# Patient Record
Sex: FEMALE | Race: WHITE | NOT HISPANIC OR LATINO | Employment: FULL TIME | URBAN - METROPOLITAN AREA
[De-identification: names, ages, dates, MRNs, and addresses within clinical notes are randomized per-mention and may not be internally consistent; named-entity substitution may affect disease eponyms.]

---

## 2018-02-08 ENCOUNTER — HOSPITAL ENCOUNTER (OUTPATIENT)
Dept: LAB | Facility: MEDICAL CENTER | Age: 33
End: 2018-02-08
Attending: NURSE PRACTITIONER
Payer: COMMERCIAL

## 2018-02-08 ENCOUNTER — OFFICE VISIT (OUTPATIENT)
Dept: MEDICAL GROUP | Facility: PHYSICIAN GROUP | Age: 33
End: 2018-02-08
Payer: COMMERCIAL

## 2018-02-08 VITALS
RESPIRATION RATE: 16 BRPM | TEMPERATURE: 99.9 F | SYSTOLIC BLOOD PRESSURE: 110 MMHG | OXYGEN SATURATION: 96 % | HEIGHT: 70 IN | DIASTOLIC BLOOD PRESSURE: 80 MMHG | HEART RATE: 72 BPM | WEIGHT: 182 LBS | BODY MASS INDEX: 26.05 KG/M2

## 2018-02-08 DIAGNOSIS — Z00.00 ENCOUNTER FOR WELLNESS EXAMINATION: ICD-10-CM

## 2018-02-08 DIAGNOSIS — Z00.00 WELLNESS EXAMINATION: ICD-10-CM

## 2018-02-08 DIAGNOSIS — E03.9 HYPOTHYROIDISM, UNSPECIFIED TYPE: ICD-10-CM

## 2018-02-08 DIAGNOSIS — I45.10 INCOMPLETE RBBB: ICD-10-CM

## 2018-02-08 DIAGNOSIS — Z76.89 ENCOUNTER TO ESTABLISH CARE WITH NEW DOCTOR: ICD-10-CM

## 2018-02-08 LAB
25(OH)D3 SERPL-MCNC: 11 NG/ML (ref 30–100)
ALBUMIN SERPL BCP-MCNC: 4.5 G/DL (ref 3.2–4.9)
ALBUMIN/GLOB SERPL: 1.6 G/DL
ALP SERPL-CCNC: 42 U/L (ref 30–99)
ALT SERPL-CCNC: 9 U/L (ref 2–50)
ANION GAP SERPL CALC-SCNC: 7 MMOL/L (ref 0–11.9)
AST SERPL-CCNC: 12 U/L (ref 12–45)
BASOPHILS # BLD AUTO: 0.8 % (ref 0–1.8)
BASOPHILS # BLD: 0.06 K/UL (ref 0–0.12)
BILIRUB SERPL-MCNC: 0.7 MG/DL (ref 0.1–1.5)
BUN SERPL-MCNC: 17 MG/DL (ref 8–22)
CALCIUM SERPL-MCNC: 9.8 MG/DL (ref 8.5–10.5)
CHLORIDE SERPL-SCNC: 107 MMOL/L (ref 96–112)
CHOLEST SERPL-MCNC: 141 MG/DL (ref 100–199)
CO2 SERPL-SCNC: 23 MMOL/L (ref 20–33)
CREAT SERPL-MCNC: 0.77 MG/DL (ref 0.5–1.4)
EOSINOPHIL # BLD AUTO: 0.21 K/UL (ref 0–0.51)
EOSINOPHIL NFR BLD: 2.9 % (ref 0–6.9)
ERYTHROCYTE [DISTWIDTH] IN BLOOD BY AUTOMATED COUNT: 38.3 FL (ref 35.9–50)
GLOBULIN SER CALC-MCNC: 2.8 G/DL (ref 1.9–3.5)
GLUCOSE SERPL-MCNC: 80 MG/DL (ref 65–99)
HCT VFR BLD AUTO: 47.1 % (ref 37–47)
HDLC SERPL-MCNC: 64 MG/DL
HGB BLD-MCNC: 15.1 G/DL (ref 12–16)
IMM GRANULOCYTES # BLD AUTO: 0.02 K/UL (ref 0–0.11)
IMM GRANULOCYTES NFR BLD AUTO: 0.3 % (ref 0–0.9)
LDLC SERPL CALC-MCNC: 66 MG/DL
LYMPHOCYTES # BLD AUTO: 1.75 K/UL (ref 1–4.8)
LYMPHOCYTES NFR BLD: 24.5 % (ref 22–41)
MCH RBC QN AUTO: 27.8 PG (ref 27–33)
MCHC RBC AUTO-ENTMCNC: 32.1 G/DL (ref 33.6–35)
MCV RBC AUTO: 86.7 FL (ref 81.4–97.8)
MONOCYTES # BLD AUTO: 0.59 K/UL (ref 0–0.85)
MONOCYTES NFR BLD AUTO: 8.3 % (ref 0–13.4)
NEUTROPHILS # BLD AUTO: 4.5 K/UL (ref 2–7.15)
NEUTROPHILS NFR BLD: 63.2 % (ref 44–72)
NRBC # BLD AUTO: 0 K/UL
NRBC BLD-RTO: 0 /100 WBC
PLATELET # BLD AUTO: 314 K/UL (ref 164–446)
PMV BLD AUTO: 10.1 FL (ref 9–12.9)
POTASSIUM SERPL-SCNC: 4.3 MMOL/L (ref 3.6–5.5)
PROT SERPL-MCNC: 7.3 G/DL (ref 6–8.2)
RBC # BLD AUTO: 5.43 M/UL (ref 4.2–5.4)
SODIUM SERPL-SCNC: 137 MMOL/L (ref 135–145)
T3 SERPL-MCNC: 75.5 NG/DL (ref 60–181)
T4 FREE SERPL-MCNC: 1.1 NG/DL (ref 0.53–1.43)
TRIGL SERPL-MCNC: 57 MG/DL (ref 0–149)
TSH SERPL DL<=0.005 MIU/L-ACNC: 1.68 UIU/ML (ref 0.38–5.33)
WBC # BLD AUTO: 7.1 K/UL (ref 4.8–10.8)

## 2018-02-08 PROCEDURE — 99385 PREV VISIT NEW AGE 18-39: CPT | Performed by: NURSE PRACTITIONER

## 2018-02-08 PROCEDURE — 82306 VITAMIN D 25 HYDROXY: CPT

## 2018-02-08 PROCEDURE — 36415 COLL VENOUS BLD VENIPUNCTURE: CPT

## 2018-02-08 PROCEDURE — 85025 COMPLETE CBC W/AUTO DIFF WBC: CPT

## 2018-02-08 PROCEDURE — 84443 ASSAY THYROID STIM HORMONE: CPT

## 2018-02-08 PROCEDURE — 80053 COMPREHEN METABOLIC PANEL: CPT

## 2018-02-08 PROCEDURE — 84480 ASSAY TRIIODOTHYRONINE (T3): CPT

## 2018-02-08 PROCEDURE — 84439 ASSAY OF FREE THYROXINE: CPT

## 2018-02-08 PROCEDURE — 80061 LIPID PANEL: CPT

## 2018-02-08 ASSESSMENT — PATIENT HEALTH QUESTIONNAIRE - PHQ9: CLINICAL INTERPRETATION OF PHQ2 SCORE: 0

## 2018-02-08 NOTE — ASSESSMENT & PLAN NOTE
She is here for her annual wellness visit.  She has no current issues or complaints, and she is due for wellness labs.

## 2018-02-08 NOTE — PROGRESS NOTES
Chief Complaint   Patient presents with   • Hypothyroidism       HISTORY OF PRESENT ILLNESS: Patient is a 32 y.o. female new patient who presents today to discuss the following issues:    Encounter to establish care with new doctor  Is here to establish with a new primary care provider.  Was previously seen by Yasmin Murray.      Incomplete RBBB  Diagnosed in 2012, worked up by cardiology.  No need for further follow-up.    Hypothyroid  Chronic in nature, stable on meds.  Followed by Dr. Steve.    Encounter for wellness examination  She is here for her annual wellness visit.  She has no current issues or complaints, and she is due for wellness labs.      Patient Active Problem List    Diagnosis Date Noted   • Encounter to establish care with new doctor 02/08/2018   • Encounter for wellness examination 02/08/2018   • Anxiety 04/24/2014   • Incomplete RBBB 12/12/2012   • Hypothyroid 12/12/2012       Allergies:Patient has no known allergies.    Current Outpatient Prescriptions   Medication Sig Dispense Refill   • Levothyroxine Sodium (TIROSINT) 25 MCG CAPS Take 50 mcg by mouth every day.       No current facility-administered medications for this visit.        Social History   Substance Use Topics   • Smoking status: Former Smoker     Quit date: 12/12/2010   • Smokeless tobacco: Never Used   • Alcohol use No       Family Status   Relation Status   • Mother Alive   • Father Alive   • Sister Alive   • Brother Alive   • Brother Alive     Family History   Problem Relation Age of Onset   • Hypertension Father        Review of Systems:   Constitutional: Negative for fever, chills, weight loss and malaise/fatigue.   HENT: Negative for ear pain, nosebleeds, congestion, sore throat and neck pain.    Eyes: Negative for blurred vision.   Respiratory: Negative for cough, sputum production, shortness of breath and wheezing.    Cardiovascular: Negative for chest pain, palpitations, orthopnea and leg swelling.  "  Gastrointestinal: Negative for heartburn, nausea, vomiting and abdominal pain.   Genitourinary: Negative for dysuria, urgency and frequency.   Musculoskeletal: Negative for myalgias, joint pain, and back pain.  Skin: Negative for rash and itching.   Neurological: Negative for dizziness, tingling, tremors, sensory change, focal weakness and headaches.   Endo/Heme/Allergies: Does not bruise/bleed easily.   Psychiatric/Behavioral: Negative for depression, suicidal ideas and memory loss.  The patient is not nervous/anxious and does not have insomnia.    All other systems reviewed and are negative except as in HPI.    Exam:  Blood pressure 110/80, pulse 72, temperature 37.7 °C (99.9 °F), resp. rate 16, height 1.778 m (5' 10\"), weight 82.6 kg (182 lb), last menstrual period 01/18/2018, SpO2 96 %, not currently breastfeeding.  General:  Well nourished, well developed female in NAD  Head: Grossly normal.  Neck: Supple without JVD or bruit. Thyroid is not enlarged.  Pulmonary: Clear to ausculation. Normal effort. No rales, ronchi, or wheezing.  Cardiovascular: Regular rate and rhythm without murmur.   Abdomen:  Bowel sounds + x 4. Soft, non-tender, nondistended.  Extremities: No clubbing, cyanosis, or edema.  Skin: Intact with no obvious rashes or lesions.  Neuro: Grossly intact.  Psych: Alert and oriented x 3.  Mood and affect appropriate.    Medical decision-making and discussion: Ceci is here to establish with a new primary care provider.  We reviewed her past medical history and discussed her current medications.  Lab work was ordered. She will sign a records release for her previous provider, [facility], and she will plan to follow-up here as needed.         Assessment/Plan:  1. Encounter to establish care with new doctor     2. Incomplete RBBB     3. Wellness examination  CBC WITH DIFFERENTIAL    COMP METABOLIC PANEL    LIPID PROFILE    TSH    VITAMIN D,25 HYDROXY    TRIIDOTHYRONINE    FREE " THYROXINE   4. Hypothyroidism, unspecified type     5. Encounter for wellness examination         Return if symptoms worsen or fail to improve.    Please note that this dictation was created using voice recognition software. I have made every reasonable attempt to correct obvious errors, but I expect that there are errors of grammar and possibly content that I did not discover before finalizing the note.

## 2018-02-13 RX ORDER — ERGOCALCIFEROL 1.25 MG/1
CAPSULE ORAL
Qty: 16 CAP | Refills: 0 | Status: SHIPPED | OUTPATIENT
Start: 2018-02-13 | End: 2018-03-12 | Stop reason: SDUPTHER

## 2018-03-12 RX ORDER — ERGOCALCIFEROL 1.25 MG/1
50000 CAPSULE ORAL
Qty: 12 CAP | Refills: 3 | Status: SHIPPED | OUTPATIENT
Start: 2018-03-12 | End: 2019-12-11

## 2018-03-12 NOTE — TELEPHONE ENCOUNTER
Irineo- You initiated pt on vit 2/18, not sure if you would like pt to continue this dose or adjust. Please advise.

## 2019-03-12 ENCOUNTER — HOSPITAL ENCOUNTER (OUTPATIENT)
Dept: LAB | Facility: MEDICAL CENTER | Age: 34
End: 2019-03-12
Attending: PHYSICIAN ASSISTANT
Payer: COMMERCIAL

## 2019-03-12 LAB
T4 FREE SERPL-MCNC: 1.09 NG/DL (ref 0.53–1.43)
TSH SERPL DL<=0.005 MIU/L-ACNC: 1.25 UIU/ML (ref 0.38–5.33)

## 2019-03-12 PROCEDURE — 84443 ASSAY THYROID STIM HORMONE: CPT

## 2019-03-12 PROCEDURE — 84439 ASSAY OF FREE THYROXINE: CPT

## 2019-03-12 PROCEDURE — 36415 COLL VENOUS BLD VENIPUNCTURE: CPT

## 2019-03-21 NOTE — TELEPHONE ENCOUNTER
Was the patient seen in the last year in this department? No     Does patient have an active prescription for medications requested? No     Received Request Via: Pharmacy      Pt met protocol?: No   Pt last ov 2/18 has an upcoming appt 4/2/19   25-Hydroxy   Vitamin D 25   Date Value Ref Range Status   02/08/2018 11 (L) 30 - 100 ng/mL Final     Comment:     Adult Ranges:   <20 ng/mL - Deficiency  20-29 ng/mL - Insufficiency   ng/mL - Sufficiency  The [employer]ia Centaur Vitamin D Assay is standardized to the  [facility] reference measurement procedures, a  reference method for the Vitamin D Standardization Program  (VDSP).  The VDSP aligns patient results among 25 (OH)  Vitamin D methods.

## 2019-03-22 RX ORDER — ERGOCALCIFEROL 1.25 MG/1
CAPSULE ORAL
Qty: 12 CAP | Refills: 0 | Status: SHIPPED | OUTPATIENT
Start: 2019-03-22 | End: 2019-04-02

## 2019-04-02 ENCOUNTER — OFFICE VISIT (OUTPATIENT)
Dept: MEDICAL GROUP | Facility: PHYSICIAN GROUP | Age: 34
End: 2019-04-02
Payer: COMMERCIAL

## 2019-04-02 VITALS
HEIGHT: 70 IN | BODY MASS INDEX: 25.05 KG/M2 | TEMPERATURE: 98.6 F | WEIGHT: 175 LBS | RESPIRATION RATE: 17 BRPM | DIASTOLIC BLOOD PRESSURE: 64 MMHG | HEART RATE: 75 BPM | OXYGEN SATURATION: 99 % | SYSTOLIC BLOOD PRESSURE: 118 MMHG

## 2019-04-02 DIAGNOSIS — R00.2 PALPITATIONS: ICD-10-CM

## 2019-04-02 DIAGNOSIS — Z00.00 WELLNESS EXAMINATION: ICD-10-CM

## 2019-04-02 DIAGNOSIS — Z00.00 ENCOUNTER FOR WELLNESS EXAMINATION: ICD-10-CM

## 2019-04-02 DIAGNOSIS — E03.9 HYPOTHYROIDISM, UNSPECIFIED TYPE: ICD-10-CM

## 2019-04-02 DIAGNOSIS — R07.9 CHEST PAIN, UNSPECIFIED TYPE: ICD-10-CM

## 2019-04-02 PROBLEM — Z76.89 ENCOUNTER TO ESTABLISH CARE WITH NEW DOCTOR: Status: RESOLVED | Noted: 2018-02-08 | Resolved: 2019-04-02

## 2019-04-02 PROCEDURE — 99395 PREV VISIT EST AGE 18-39: CPT | Performed by: NURSE PRACTITIONER

## 2019-04-02 RX ORDER — LEVOTHYROXINE SODIUM 0.05 MG/1
50 TABLET ORAL
COMMUNITY
End: 2019-12-11

## 2019-04-02 ASSESSMENT — PATIENT HEALTH QUESTIONNAIRE - PHQ9: CLINICAL INTERPRETATION OF PHQ2 SCORE: 0

## 2019-04-03 NOTE — PROGRESS NOTES
Chief Complaint   Patient presents with   • Annual Exam   • Orders Needed     Requesting Labs        HISTORY OF PRESENT ILLNESS: Patient is a 33 y.o. female established patient who presents today to discuss the following issues:    Chest pain  Reports that for several years she has had issues with intermittent chest pain.  She has had ekgs, a stress test, a stress echo, and a holter monitor, all of which were normal.  She reports that she feels like her heart pounds at times, and she feels like her blood pressure is very sensitive to changes.  None of her symptoms occurred during any of her previous testing.  Discussed options, and she would like to proceed with a referral to cardiology.  She has no chest pain or symptoms at this time.    Encounter for wellness examination  She is here for her annual wellness visit.  She is due for labs.    Hypothyroid  Followed by endocrinology.  Recent labs were normal.      Patient Active Problem List    Diagnosis Date Noted   • Palpitations 04/02/2019   • Encounter for wellness examination 02/08/2018   • Anxiety 04/24/2014   • Chest pain 12/12/2012   • Incomplete RBBB 12/12/2012   • Hypothyroid 12/12/2012       Allergies:Patient has no known allergies.    Current Outpatient Prescriptions   Medication Sig Dispense Refill   • levothyroxine (SYNTHROID) 50 MCG Tab Take 50 mcg by mouth Every morning on an empty stomach.     • vitamin D, Ergocalciferol, (DRISDOL) 66757 units Cap capsule Take 1 Cap by mouth every 7 days. 12 Cap 3     No current facility-administered medications for this visit.        Social History   Substance Use Topics   • Smoking status: Former Smoker     Quit date: 12/12/2010   • Smokeless tobacco: Never Used   • Alcohol use No       Family Status   Relation Status   • Mo Alive   • Fa Alive   • Sis Alive   • Bro Alive   • Bro Alive     Family History   Problem Relation Age of Onset   • Hypertension Father        Review of Systems:   Constitutional: Negative for  "fever, chills, weight loss and malaise/fatigue.   HENT: Negative for ear pain, nosebleeds, congestion, sore throat and neck pain.    Eyes: Negative for blurred vision.   Respiratory: Negative for cough, sputum production, shortness of breath and wheezing.    Cardiovascular: Negative for orthopnea and leg swelling. Positive for intermittent chest pain and palpitations.  Gastrointestinal: Negative for heartburn, nausea, vomiting and abdominal pain.   Genitourinary: Negative for dysuria, urgency and frequency.   Musculoskeletal: Negative for myalgias, joint pain, and back pain.  Skin: Negative for rash and itching.   Neurological: Negative for dizziness, tingling, tremors, sensory change, focal weakness and headaches.   Endo/Heme/Allergies: Does not bruise/bleed easily.   Psychiatric/Behavioral: Negative for depression, suicidal ideas and memory loss.  The patient is not nervous/anxious and does not have insomnia.    All other systems reviewed and are negative except as in HPI.    Exam:  Blood pressure 118/64, pulse 75, temperature 37 °C (98.6 °F), resp. rate 17, height 1.778 m (5' 10\"), weight 79.4 kg (175 lb), last menstrual period 03/26/2019, SpO2 99 %, not currently breastfeeding.  General:  Well nourished, well developed female in NAD  Head: Grossly normal.  Neck: Supple without JVD or bruit. Thyroid is not enlarged.  Pulmonary: Clear to ausculation. Normal effort. No rales, ronchi, or wheezing.  Cardiovascular: Regular rate and rhythm without murmur. No chest wall tenderness.  Abdomen:  Soft, nontender, nondistended.  Extremities: No clubbing, cyanosis, or edema.  Skin: Intact with no obvious rashes or lesions.  Neuro: Grossly intact.  Psych: Alert and oriented x 3.  Mood and affect appropriate.    Medical decision-making and discussion: Ceci is here today for her annual wellness visit.  Lab work was ordered and a referral ws sent to cardiology.  She will follow-up here as needed.          Assessment/Plan:  1. " Wellness examination  CBC WITH DIFFERENTIAL    Comp Metabolic Panel    Lipid Profile    VITAMIN D,25 HYDROXY   2. Palpitations  REFERRAL TO CARDIOLOGY   3. Chest pain, unspecified type  REFERRAL TO CARDIOLOGY   4. Encounter for wellness examination     5. Hypothyroidism, unspecified type         Return if symptoms worsen or fail to improve.    Please note that this dictation was created using voice recognition software. I have made every reasonable attempt to correct obvious errors, but I expect that there are errors of grammar and possibly content that I did not discover before finalizing the note.

## 2019-04-03 NOTE — ASSESSMENT & PLAN NOTE
Reports that for several years she has had issues with intermittent chest pain.  She has had ekgs, a stress test, a stress echo, and a holter monitor, all of which were normal.  She reports that she feels like her heart pounds at times, and she feels like her blood pressure is very sensitive to changes.  None of her symptoms occurred during any of her previous testing.  Discussed options, and she would like to proceed with a referral to cardiology.  She has no chest pain or symptoms at this time.

## 2019-04-27 ENCOUNTER — HOSPITAL ENCOUNTER (OUTPATIENT)
Dept: LAB | Facility: MEDICAL CENTER | Age: 34
End: 2019-04-27
Attending: NURSE PRACTITIONER
Payer: COMMERCIAL

## 2019-04-27 DIAGNOSIS — Z00.00 WELLNESS EXAMINATION: ICD-10-CM

## 2019-04-27 LAB
25(OH)D3 SERPL-MCNC: 38 NG/ML (ref 30–100)
ALBUMIN SERPL BCP-MCNC: 4.3 G/DL (ref 3.2–4.9)
ALBUMIN/GLOB SERPL: 1.7 G/DL
ALP SERPL-CCNC: 50 U/L (ref 30–99)
ALT SERPL-CCNC: 8 U/L (ref 2–50)
ANION GAP SERPL CALC-SCNC: 6 MMOL/L (ref 0–11.9)
AST SERPL-CCNC: 10 U/L (ref 12–45)
BASOPHILS # BLD AUTO: 1.3 % (ref 0–1.8)
BASOPHILS # BLD: 0.06 K/UL (ref 0–0.12)
BILIRUB SERPL-MCNC: 0.7 MG/DL (ref 0.1–1.5)
BUN SERPL-MCNC: 16 MG/DL (ref 8–22)
CALCIUM SERPL-MCNC: 9.4 MG/DL (ref 8.5–10.5)
CHLORIDE SERPL-SCNC: 108 MMOL/L (ref 96–112)
CHOLEST SERPL-MCNC: 159 MG/DL (ref 100–199)
CO2 SERPL-SCNC: 26 MMOL/L (ref 20–33)
CREAT SERPL-MCNC: 0.78 MG/DL (ref 0.5–1.4)
EOSINOPHIL # BLD AUTO: 0.24 K/UL (ref 0–0.51)
EOSINOPHIL NFR BLD: 5.3 % (ref 0–6.9)
ERYTHROCYTE [DISTWIDTH] IN BLOOD BY AUTOMATED COUNT: 38.5 FL (ref 35.9–50)
FASTING STATUS PATIENT QL REPORTED: NORMAL
GLOBULIN SER CALC-MCNC: 2.5 G/DL (ref 1.9–3.5)
GLUCOSE SERPL-MCNC: 96 MG/DL (ref 65–99)
HCT VFR BLD AUTO: 46.6 % (ref 37–47)
HDLC SERPL-MCNC: 65 MG/DL
HGB BLD-MCNC: 15.1 G/DL (ref 12–16)
IMM GRANULOCYTES # BLD AUTO: 0 K/UL (ref 0–0.11)
IMM GRANULOCYTES NFR BLD AUTO: 0 % (ref 0–0.9)
LDLC SERPL CALC-MCNC: 85 MG/DL
LYMPHOCYTES # BLD AUTO: 1.53 K/UL (ref 1–4.8)
LYMPHOCYTES NFR BLD: 34 % (ref 22–41)
MCH RBC QN AUTO: 28.8 PG (ref 27–33)
MCHC RBC AUTO-ENTMCNC: 32.4 G/DL (ref 33.6–35)
MCV RBC AUTO: 88.8 FL (ref 81.4–97.8)
MONOCYTES # BLD AUTO: 0.39 K/UL (ref 0–0.85)
MONOCYTES NFR BLD AUTO: 8.7 % (ref 0–13.4)
NEUTROPHILS # BLD AUTO: 2.28 K/UL (ref 2–7.15)
NEUTROPHILS NFR BLD: 50.7 % (ref 44–72)
NRBC # BLD AUTO: 0 K/UL
NRBC BLD-RTO: 0 /100 WBC
PLATELET # BLD AUTO: 309 K/UL (ref 164–446)
PMV BLD AUTO: 10 FL (ref 9–12.9)
POTASSIUM SERPL-SCNC: 4.4 MMOL/L (ref 3.6–5.5)
PROT SERPL-MCNC: 6.8 G/DL (ref 6–8.2)
RBC # BLD AUTO: 5.25 M/UL (ref 4.2–5.4)
SODIUM SERPL-SCNC: 140 MMOL/L (ref 135–145)
TRIGL SERPL-MCNC: 46 MG/DL (ref 0–149)
WBC # BLD AUTO: 4.5 K/UL (ref 4.8–10.8)

## 2019-04-27 PROCEDURE — 80061 LIPID PANEL: CPT

## 2019-04-27 PROCEDURE — 80053 COMPREHEN METABOLIC PANEL: CPT

## 2019-04-27 PROCEDURE — 85025 COMPLETE CBC W/AUTO DIFF WBC: CPT

## 2019-04-27 PROCEDURE — 82306 VITAMIN D 25 HYDROXY: CPT

## 2019-04-27 PROCEDURE — 36415 COLL VENOUS BLD VENIPUNCTURE: CPT

## 2019-06-06 ENCOUNTER — HOSPITAL ENCOUNTER (OUTPATIENT)
Dept: LAB | Facility: MEDICAL CENTER | Age: 34
End: 2019-06-06
Attending: FAMILY MEDICINE
Payer: COMMERCIAL

## 2019-06-06 LAB
ERYTHROCYTE [SEDIMENTATION RATE] IN BLOOD BY WESTERGREN METHOD: 4 MM/HOUR (ref 0–20)
EST. AVERAGE GLUCOSE BLD GHB EST-MCNC: 105 MG/DL
FERRITIN SERPL-MCNC: 25.4 NG/ML (ref 10–291)
HBA1C MFR BLD: 5.3 % (ref 0–5.6)
IRON SATN MFR SERPL: 32 % (ref 15–55)
IRON SERPL-MCNC: 94 UG/DL (ref 40–170)
TIBC SERPL-MCNC: 294 UG/DL (ref 250–450)
VIT B12 SERPL-MCNC: 317 PG/ML (ref 211–911)

## 2019-06-06 PROCEDURE — 83540 ASSAY OF IRON: CPT

## 2019-06-06 PROCEDURE — 82728 ASSAY OF FERRITIN: CPT

## 2019-06-06 PROCEDURE — 86003 ALLG SPEC IGE CRUDE XTRC EA: CPT | Mod: 91

## 2019-06-06 PROCEDURE — 82607 VITAMIN B-12: CPT

## 2019-06-06 PROCEDURE — 82785 ASSAY OF IGE: CPT

## 2019-06-06 PROCEDURE — 83036 HEMOGLOBIN GLYCOSYLATED A1C: CPT

## 2019-06-06 PROCEDURE — 86038 ANTINUCLEAR ANTIBODIES: CPT

## 2019-06-06 PROCEDURE — 85652 RBC SED RATE AUTOMATED: CPT

## 2019-06-06 PROCEDURE — 36415 COLL VENOUS BLD VENIPUNCTURE: CPT

## 2019-06-06 PROCEDURE — 83550 IRON BINDING TEST: CPT

## 2019-06-09 LAB — NUCLEAR IGG SER QL IA: NORMAL

## 2019-06-11 LAB
ALMOND IGE QN: <0.1 KU/L
AVOCADO IGE QN: 0.16 KU/L
BANANA IGE QN: 0.2 KU/L
CELERY IGE QN: 0.48 KU/L
CHESTNUT IGE QN: 0.13 KU/L
COCONUT IGE QN: 0.14 KU/L
COW MILK IGE QN: <0.1 KU/L
DEPRECATED MISC ALLERGEN IGE RAST QL: NORMAL
EGG WHITE IGE QN: <0.1 KU/L
GRAPE IGE QN: <0.1 KU/L
IGE SERPL-ACNC: 30 KU/L
KIWIFRUIT IGE QN: 0.1 KU/L
OAT IGE QN: <0.1 KU/L
PAPAYA IGE QN: 0.16 KU/L
PEANUT IGE QN: 0.15 KU/L
PECAN/HICK NUT IGE QN: <0.1 KU/L
POTATO IGE QN: <0.1 KU/L
SESAME SEED IGE QN: 0.25 KU/L
SOYBEAN IGE QN: <0.1 KU/L
TOMATO IGE QN: 0.25 KU/L
WATERMELON IGE QN: 0.11 KU/L
WHEAT IGE QN: 0.15 KU/L

## 2019-06-24 NOTE — TELEPHONE ENCOUNTER
Was the patient seen in the last year in this department? Yes    Does patient have an active prescription for medications requested? No     Received Request Via: Pharmacy      Pt met protocol?: Yes    LAST OV 04/02/2019      Lab Results  Component Value Date/Time   25HYDROXY 38 04/27/2019 0726

## 2019-06-25 RX ORDER — ERGOCALCIFEROL 1.25 MG/1
CAPSULE ORAL
Refills: 0 | OUTPATIENT
Start: 2019-06-25

## 2019-07-17 RX ORDER — ERGOCALCIFEROL 1.25 MG/1
CAPSULE ORAL
Refills: 0 | OUTPATIENT
Start: 2019-07-17

## 2019-08-27 ENCOUNTER — OFFICE VISIT (OUTPATIENT)
Dept: MEDICAL GROUP | Facility: PHYSICIAN GROUP | Age: 34
End: 2019-08-27
Payer: COMMERCIAL

## 2019-08-27 ENCOUNTER — HOSPITAL ENCOUNTER (OUTPATIENT)
Dept: RADIOLOGY | Facility: MEDICAL CENTER | Age: 34
End: 2019-08-27
Attending: NURSE PRACTITIONER
Payer: COMMERCIAL

## 2019-08-27 VITALS
TEMPERATURE: 98.3 F | HEART RATE: 79 BPM | OXYGEN SATURATION: 94 % | SYSTOLIC BLOOD PRESSURE: 110 MMHG | WEIGHT: 168 LBS | RESPIRATION RATE: 16 BRPM | BODY MASS INDEX: 24.05 KG/M2 | HEIGHT: 70 IN | DIASTOLIC BLOOD PRESSURE: 60 MMHG

## 2019-08-27 DIAGNOSIS — M54.5 LOW BACK PAIN, UNSPECIFIED BACK PAIN LATERALITY, UNSPECIFIED CHRONICITY, WITH SCIATICA PRESENCE UNSPECIFIED: ICD-10-CM

## 2019-08-27 PROBLEM — M54.50 LOW BACK PAIN: Status: ACTIVE | Noted: 2019-08-27

## 2019-08-27 PROBLEM — Z00.00 ENCOUNTER FOR WELLNESS EXAMINATION: Status: RESOLVED | Noted: 2018-02-08 | Resolved: 2019-08-27

## 2019-08-27 PROCEDURE — 99214 OFFICE O/P EST MOD 30 MIN: CPT | Performed by: NURSE PRACTITIONER

## 2019-08-27 PROCEDURE — 72100 X-RAY EXAM L-S SPINE 2/3 VWS: CPT

## 2019-08-27 RX ORDER — METHYLPREDNISOLONE 4 MG/1
TABLET ORAL
Qty: 21 TAB | Refills: 0 | Status: SHIPPED | OUTPATIENT
Start: 2019-08-27 | End: 2019-12-11

## 2019-08-27 RX ORDER — LEVOTHYROXINE SODIUM 50 UG/1
CAPSULE ORAL
Refills: 1 | COMMUNITY
Start: 2019-07-23 | End: 2019-12-11

## 2019-08-27 NOTE — PROGRESS NOTES
Chief Complaint   Patient presents with   • Back Pain     x 4 months        HISTORY OF PRESENT ILLNESS: Patient is a 33 y.o. female established patient who presents today to discuss the following issues:    Low back pain  Low back pain started about 4 months ago when she started running for exercise.  She saw a chiropractor and it calmed down, but she continues to have intermittent pain.  The pain is mid low back, sharp, and radiates into her left SI joint, and down her left leg.  Discussed options.  She would like to proceed with an xray, a medrol dose pack, and a trial of physical therapy.  She will follow-up if her symptoms do not improve.      Patient Active Problem List    Diagnosis Date Noted   • Low back pain 2019   • Palpitations 2019   • Anxiety 2014   • Incomplete RBBB 2012   • Hypothyroid 2012       Allergies:Patient has no known allergies.    Current Outpatient Medications   Medication Sig Dispense Refill   • TIROSINT 50 MCG Cap TAKE ONE CAPSULE BY MOUTH IN THE MORNING SIX DAYS PER WEEK AND ON THE 7th DAY TAKE TWO CAPSULES AS DIRECTED  1   • methylPREDNISolone (MEDROL DOSEPAK) 4 MG Tablet Therapy Pack As directed on the packaging label. 21 Tab 0   • levothyroxine (SYNTHROID) 50 MCG Tab Take 50 mcg by mouth Every morning on an empty stomach.     • vitamin D, Ergocalciferol, (DRISDOL) 81208 units Cap capsule Take 1 Cap by mouth every 7 days. (Patient not taking: Reported on 2019) 12 Cap 3     No current facility-administered medications for this visit.        Social History     Tobacco Use   • Smoking status: Former Smoker     Last attempt to quit: 2010     Years since quittin.7   • Smokeless tobacco: Never Used   Substance Use Topics   • Alcohol use: No   • Drug use: No       Family Status   Relation Name Status   • Mo  Alive   • Fa  Alive   • Sis  Alive   • Bro  Alive   • Bro  Alive     Family History   Problem Relation Age of Onset   • Hypertension Father   "      Review of Systems:   Constitutional: Negative for fever, chills, weight loss and malaise/fatigue.   HENT: Negative for ear pain, nosebleeds, congestion, sore throat and neck pain.    Eyes: Negative for blurred vision.   Respiratory: Negative for cough, sputum production, shortness of breath and wheezing.    Cardiovascular: Negative for chest pain, palpitations, orthopnea and leg swelling.   Gastrointestinal: Negative for heartburn, nausea, vomiting and abdominal pain.   Genitourinary: Negative for dysuria, urgency and frequency.   Musculoskeletal: Negative for myalgias, joint pain.  Positive for low back pain with radiculopathy.  Skin: Negative for rash and itching.   Neurological: Negative for dizziness, tingling, tremors, sensory change, focal weakness and headaches.   Endo/Heme/Allergies: Does not bruise/bleed easily.   Psychiatric/Behavioral: Negative for depression, suicidal ideas and memory loss.  The patient is not nervous/anxious and does not have insomnia.    All other systems reviewed and are negative except as in HPI.    Exam:  /60   Pulse 79   Temp 36.8 °C (98.3 °F)   Resp 16   Ht 1.778 m (5' 10\")   Wt 76.2 kg (168 lb)   SpO2 94%   General:  Well nourished, well developed female in NAD  Head: Grossly normal.  Neck: Supple without JVD or bruit. Thyroid is not enlarged.  Pulmonary: Clear to ausculation. Normal effort. No rales, ronchi, or wheezing.  Cardiovascular: Regular rate and rhythm without murmur.   Abdomen:  Soft, nontender, nondistended.  Extremities: No clubbing, cyanosis, or edema.  Skin: Intact with no obvious rashes or lesions.  Neuro: Grossly intact.  Psych: Alert and oriented x 3.  Mood and affect appropriate.    Medical decision-making and discussion: Ceci is here today to discuss back pain.  An xray was ordered, a medrol dose pack was sent to her pharmacy, and a referral was sent to PT.  She will follow-up here as needed.          Assessment/Plan:  1. Low back pain, " unspecified back pain laterality, unspecified chronicity, with sciatica presence unspecified  DX-LUMBAR SPINE-2 OR 3 VIEWS    methylPREDNISolone (MEDROL DOSEPAK) 4 MG Tablet Therapy Pack    REFERRAL TO PHYSICAL THERAPY Reason for Therapy: Eval/Treat/Report       Return if symptoms worsen or fail to improve.    Please note that this dictation was created using voice recognition software. I have made every reasonable attempt to correct obvious errors, but I expect that there are errors of grammar and possibly content that I did not discover before finalizing the note.

## 2019-08-27 NOTE — ASSESSMENT & PLAN NOTE
Low back pain started about 4 months ago when she started running for exercise.  She saw a chiropractor and it calmed down, but she continues to have intermittent pain.  The pain is mid low back, sharp, and radiates into her left SI joint, and down her left leg.  Discussed options.  She would like to proceed with an xray, a medrol dose pack, and a trial of physical therapy.  She will follow-up if her symptoms do not improve.

## 2019-09-30 ENCOUNTER — HOSPITAL ENCOUNTER (OUTPATIENT)
Dept: LAB | Facility: MEDICAL CENTER | Age: 34
End: 2019-09-30
Attending: INTERNAL MEDICINE
Payer: COMMERCIAL

## 2019-09-30 LAB
T4 FREE SERPL-MCNC: 1.09 NG/DL (ref 0.53–1.43)
TSH SERPL DL<=0.005 MIU/L-ACNC: 1.79 UIU/ML (ref 0.38–5.33)

## 2019-09-30 PROCEDURE — 84443 ASSAY THYROID STIM HORMONE: CPT

## 2019-09-30 PROCEDURE — 36415 COLL VENOUS BLD VENIPUNCTURE: CPT

## 2019-09-30 PROCEDURE — 84439 ASSAY OF FREE THYROXINE: CPT

## 2019-10-02 ENCOUNTER — PHYSICAL THERAPY (OUTPATIENT)
Dept: PHYSICAL THERAPY | Facility: REHABILITATION | Age: 34
End: 2019-10-02
Attending: NURSE PRACTITIONER
Payer: COMMERCIAL

## 2019-10-02 DIAGNOSIS — M54.50 LOW BACK PAIN, UNSPECIFIED BACK PAIN LATERALITY, UNSPECIFIED CHRONICITY, UNSPECIFIED WHETHER SCIATICA PRESENT: ICD-10-CM

## 2019-10-02 PROCEDURE — 97161 PT EVAL LOW COMPLEX 20 MIN: CPT

## 2019-10-02 PROCEDURE — 97110 THERAPEUTIC EXERCISES: CPT

## 2019-10-02 ASSESSMENT — ENCOUNTER SYMPTOMS
PAIN TIMING: INTERMITTENT
PAIN SCALE AT HIGHEST: 5
PAIN SCALE AT LOWEST: 0
QUALITY: SHARP
PAIN SCALE: 0

## 2019-10-02 NOTE — OP THERAPY EVALUATION
Outpatient Physical Therapy  INITIAL EVALUATION    [facility] [place]  [address], [address]  [place] [address]  Phone:  [phone]  Fax:  [phone]    Date of Evaluation: 10/02/2019    Patient: Ceci Nuñez  YOB: 1985  MRN: [id number]     Referring Provider: DONAL Wilcox  [address]  [place] [address]   Referring Diagnosis Low back pain, unspecified back pain laterality, unspecified chronicity, with sciatica presence unspecified [M54.5]     Time Calculation  Start time: 1630  Stop time: 1625 Time Calculation (min): 1435 minutes       Physical Therapy Occurrence Codes    Date physical therapy care plan established or reviewed:  10/2/19   Date physical therapy treatment started:  10/2/19          Chief Complaint: Back problem    Visit Diagnoses     ICD-10-CM   1. Low back pain, unspecified back pain laterality, unspecified chronicity, unspecified whether sciatica present M54.5       Subjective:   History of Present Illness:     Mechanism of injury:  Ceci Nuñez is a 33 y.o. female that presents to therapy with low back pain that radiates to the left leg no extending past her knee.  She states that symptoms came on with insidious onset. Patient denies fevers/chills, numbness/weakness of lower extremities, bowel/bladder incontinence, saddle anesthesia.     Aggravating factors: standing after sitting for a while. Standing for a long period of time. 30 minutes.     Relieving factors: sitting down, ice, NSAIDs.     ADL limitations: unable to return to recreational activities.      Pain:     Current pain ratin    At best pain ratin    At worst pain ratin    Quality:  Sharp    Pain timing:  Intermittent  Diagnostic Tests:     Diagnostic Tests Comments:  No acute fracture is detected.  Preserved lumbar lordosis. No lumbar scoliosis.  No significant lumbar spondylosis. Disc spaces are preserved. No lumbar facet hypertrophy. No  listhesis.      Past Medical History:   Diagnosis Date   • Hypothyroid 2012     History reviewed. No pertinent surgical history.  Social History     Tobacco Use   • Smoking status: Former Smoker     Last attempt to quit: 2010     Years since quittin.8   • Smokeless tobacco: Never Used   Substance Use Topics   • Alcohol use: No     Family and Occupational History     Socioeconomic History   • Marital status: Single     Spouse name: Not on file   • Number of children: Not on file   • Years of education: Not on file   • Highest education level: Not on file   Occupational History   • Not on file       Objective     Hip Screen   Hip range of motion within functional limits.  Hip strength within functional limits  Hip joint mobility within functional limits    Neurological Testing     Reflexes   Left   Ankle clonus reflex: negative    Right   Ankle clonus reflex: negative    Myotome testing   Lumbar (left)   All left lumbar myotomes within normal limits    Lumbar (right)   All right lumbar myotomes within normal limits    Dermatome testing   Lumbar (left)   All left lumbar dermatomes intact    Lumbar (right)   All right lumbar dermatomes intact    Active Range of Motion     Lumbar   Flexion: within functional limits  Extension: within functional limits (pain reported into calf)  Left lateral flexion: within functional limits  Right lateral flexion: within functional limits  Left rotation: within functional limits  Right rotation: within functional limits    Joint Play   Spine     Central PA [place]        L1: WFL       L2: WFL       L3: WFL       L4: WFL       L5: WFL       S1: WFL    Unilateral PA Glide (left)        L1: WFL       L2: WFL       L3: WFL       L4: WFL       L5: WFL       S1: WFL    Unilateral PA Glide (right)        L1: WFL       L2: WFL       L3: WFL       L4: WFL       L5: WFL       S1: WFL        Strength:      Abdominals   Lower abdominals: Able to maintain neutral statically (MMT  3+/5)    Tests       Lumbar spine (left)      Positive slump.     Left Pelvic Girdle/Sacrum   Negative: sacral rotation, sacral thrust and thigh thrust.     Left Hip   Negative Gaenslen's, SI compression and SI distraction.   SLR: Positive.     General Comments     Spine Comments   Prone extension reproduce back of thigh pain within one rep. Repeated motions not trialled based on pain that was not there prior to initiating prone extension.        Therapeutic Exercises (CPT 90060):       Therapeutic Exercise Summary: HEP instruction/performance and development. Handout provided and exercises located below:  Access Code: [email or web address]   URL: [email or web address]   Date: 10/02/2019   Prepared by: Jevon Finney      Program Notes   if symptoms increase please do not hesitate to call the office. [phone]. no pain to be reproduced with exercises.     Exercises  · Supine Sciatic Nerve Glide - 10 reps - 2x daily  · Supine Pelvic Tilt - 30 reps - 2x daily  · Supine Transversus Abdominis Bracing - Hands on Stomach - 2 reps - 30 hold - 2x daily          Time-based treatments/modalities:  Therapeutic exercise minutes (CPT 86302): 15 minutes       Assessment, Response and Plan:   Assessment details:  Ceci Nuñez is a 33 y.o. female with signs and symptoms consistent with lumbar radiculopathy without strength or sensation deficits. She requires skilled physical therapy intervention to decrease pain, increase functional mobility, improve ADL completion and establish a home exercise program.  Goals:   Short Term Goals:   1. Patient will be Independent with prescribed Home Exercise Program (HEP) and will be able to demonstrate exercises without cues for improved overall symptoms/activity tolerance.   2. Pt will improve ability to stand and walk for greater than 30 minutes without increased pain greater than 2/10.     Short term goal time span:  2-4 weeks      Long Term Goals:    3. Pt will return to recreational fitness  without exacerbation of pain greater than 1/10.   4. Pt will improve LBDI score to less than 5% indicative of improved function and reduced perceived disability.    Long term goal time span:  4-6 weeks    Plan:   Planned therapy interventions:  Therapeutic Exercise (CPT 89526), Therapeutic Activities (CPT 22254), Manual Therapy (CPT 31581), Neuromuscular Re-education (CPT 97833), E Stim Unattended (CPT 64192) and Gait Training (CPT 90021)  Frequency: 1-2x/week.  Duration in weeks:  6  Discussed with:  Patient    Functional Assessment Used  PT Functional Assessment Tool Used: Low Back Disability Questionnaire (revised oswestry)  PT Functional Assessment Score: 10%     Referring provider co-signature:  I have reviewed this plan of care and my co-signature certifies the need for services.  Certification Dates:   From 10/02/19   To 11/13/19    Physician Signature: ________________________________ Date: ______________

## 2019-10-04 ENCOUNTER — APPOINTMENT (OUTPATIENT)
Dept: PHYSICAL THERAPY | Facility: REHABILITATION | Age: 34
End: 2019-10-04
Attending: NURSE PRACTITIONER
Payer: COMMERCIAL

## 2019-10-09 ENCOUNTER — PHYSICAL THERAPY (OUTPATIENT)
Dept: PHYSICAL THERAPY | Facility: REHABILITATION | Age: 34
End: 2019-10-09
Attending: NURSE PRACTITIONER
Payer: COMMERCIAL

## 2019-10-09 DIAGNOSIS — M54.50 LOW BACK PAIN, UNSPECIFIED BACK PAIN LATERALITY, UNSPECIFIED CHRONICITY, UNSPECIFIED WHETHER SCIATICA PRESENT: ICD-10-CM

## 2019-10-09 PROCEDURE — 97110 THERAPEUTIC EXERCISES: CPT

## 2019-10-09 NOTE — OP THERAPY DAILY TREATMENT
Outpatient Physical Therapy  DAILY TREATMENT     [facility] [place]  [address] [place] [address], [address]  [facility] [address]  Phone:  [phone]  Fax:  [phone]    Date: 10/09/2019    Patient: Lauren Nuñez  YOB: 1985  MRN: [id number]     Time Calculation  Start time: 1630  Stop time: 1700 Time Calculation (min): 30 minutes       Chief Complaint: left leg/ back problem      Visit #: 2    SUBJECTIVE:  Pt reports pain about 5/10 at max since last visit. Went on 4 mile hike on Sunday. Pain with standing still o    OBJECTIVE:  Current objective measures: MMT 3+/5 lower abdominal. Pain into extension in supine.            Therapeutic Exercises (CPT 70170):     1. Supine marching, lvl 1 x 2 with cuff for proprioception of movement    2. Basic TrA with LE lift, x25    3. Ball bridges, x20    4. Basic TrA with ball roll, x25    5. Cat camel, x20    6. Prayer stretch, 30 sec x 3      Therapeutic Exercise Summary: HEP instruction/performance and development. Handout provided and routed for attachment to electronic chart.   Access Code: [email or web address]   URL: [email or web address]   Date: 10/09/2019   Prepared by: Jevon Finney      Program Notes   if symptoms increase please do not hesitate to call the office. [phone]. no pain to be reproduced with exercises.     Exercises  · Supine Sciatic Nerve Glide - 10 reps - 2x daily  · Supine Pelvic Tilt - 30 reps - 2x daily  · Child's Pose Stretch - 2 reps - 30 hold - 2x daily  · Supine March - 10 reps - 3 sets - 1x daily        Time-based treatments/modalities:  Therapeutic exercise minutes (CPT 43144): 30 minutes       Pain rating before treatment: 0  Pain rating after treatment: 0    ASSESSMENT:   Response to treatment: Pt reports no increased pain during exercises. Flexion based movement does not increase pain. Pt's hep advanced and is compliant with POC.     PLAN/RECOMMENDATIONS:   Plan for treatment: therapy treatment to continue next  visit.  Planned interventions for next visit: continue with current treatment.

## 2019-10-11 ENCOUNTER — APPOINTMENT (OUTPATIENT)
Dept: PHYSICAL THERAPY | Facility: REHABILITATION | Age: 34
End: 2019-10-11
Attending: NURSE PRACTITIONER
Payer: COMMERCIAL

## 2019-10-16 ENCOUNTER — PHYSICAL THERAPY (OUTPATIENT)
Dept: PHYSICAL THERAPY | Facility: REHABILITATION | Age: 34
End: 2019-10-16
Attending: NURSE PRACTITIONER
Payer: COMMERCIAL

## 2019-10-16 DIAGNOSIS — M54.50 LOW BACK PAIN, UNSPECIFIED BACK PAIN LATERALITY, UNSPECIFIED CHRONICITY, UNSPECIFIED WHETHER SCIATICA PRESENT: ICD-10-CM

## 2019-10-16 PROCEDURE — 97110 THERAPEUTIC EXERCISES: CPT

## 2019-10-16 NOTE — OP THERAPY DAILY TREATMENT
Outpatient Physical Therapy  DAILY TREATMENT     [facility] [place]  [address] [place] [address], [address]  [facility] [address]  Phone:  [phone]  Fax:  [phone]    Date: 10/16/2019    Patient: Lauren Nuñez  YOB: 1985  MRN: [id number]     Time Calculation  Start time: 0500  Stop time: 0530 Time Calculation (min): 30 minutes       Chief Complaint: left leg/ back problem      Visit #: 3    SUBJECTIVE:  Pt reports pain about 5/10 at max since last visit. Yesterday had pain that may have gone to calf with standing at work. Still feels that it may come on within 30 minutes of standing, other than yesterday cannot recall when it was bad within the last week.     OBJECTIVE:  Current objective measures: MMT 4/5 lower abdominal. Pain into extension in supine/standing. - slump, - SLR            Therapeutic Exercises (CPT 74480):     1. Supine marching, lvl 2 without cuff, lvl 3 x 25    2. Basic TrA with LE lift, x25    3. Ball bridges, x20 with posterior tilt with differernt ball heights    4. Basic TrA with ball roll, x25    5. Cat camel, x20    6. Prayer stretch, 30 sec x 3      Therapeutic Exercise Summary: Access Code: [email or web address]   URL: [email or web address]   Date: 10/16/2019   Prepared by: Jevon Finney      Program Notes   if symptoms increase please do not hesitate to call the office. [phone]. no pain to be reproduced with exercises.     Exercises  · Supine Sciatic Nerve Glide - 10 reps - 2x daily  · Supine Pelvic Tilt - 30 reps - 2x daily  · Child's Pose Stretch - 2 reps - 30 hold - 2x daily  · Supine Transversus Abdominis Bracing with Leg Extension - 10 reps - 3 sets - 1x daily  · Supine Bridge with Pelvic Floor Contraction on Swiss Ball - 10 reps - 2 sets - 3x weekly        Time-based treatments/modalities:  Therapeutic exercise minutes (CPT 75309): 30 minutes       Pain rating before treatment: 0  Pain rating after treatment: 0    ASSESSMENT:   Response to  treatment: Pt reports no increased pain during exercises. Flexion based movement does not increase pain. Pt's hep advanced and is compliant with POC.     PLAN/RECOMMENDATIONS:   Plan for treatment: therapy treatment to continue next visit.  Planned interventions for next visit: continue with current treatment.

## 2019-10-18 ENCOUNTER — APPOINTMENT (OUTPATIENT)
Dept: PHYSICAL THERAPY | Facility: REHABILITATION | Age: 34
End: 2019-10-18
Attending: NURSE PRACTITIONER
Payer: COMMERCIAL

## 2019-10-23 ENCOUNTER — APPOINTMENT (OUTPATIENT)
Dept: PHYSICAL THERAPY | Facility: REHABILITATION | Age: 34
End: 2019-10-23
Attending: NURSE PRACTITIONER
Payer: COMMERCIAL

## 2019-10-25 ENCOUNTER — APPOINTMENT (OUTPATIENT)
Dept: PHYSICAL THERAPY | Facility: REHABILITATION | Age: 34
End: 2019-10-25
Attending: NURSE PRACTITIONER
Payer: COMMERCIAL

## 2019-10-30 ENCOUNTER — PHYSICAL THERAPY (OUTPATIENT)
Dept: PHYSICAL THERAPY | Facility: REHABILITATION | Age: 34
End: 2019-10-30
Attending: NURSE PRACTITIONER
Payer: COMMERCIAL

## 2019-10-30 DIAGNOSIS — M54.50 LOW BACK PAIN, UNSPECIFIED BACK PAIN LATERALITY, UNSPECIFIED CHRONICITY, UNSPECIFIED WHETHER SCIATICA PRESENT: ICD-10-CM

## 2019-10-30 PROCEDURE — 97110 THERAPEUTIC EXERCISES: CPT

## 2019-10-30 PROCEDURE — 97014 ELECTRIC STIMULATION THERAPY: CPT

## 2019-10-31 NOTE — OP THERAPY DAILY TREATMENT
Outpatient Physical Therapy  DAILY TREATMENT     [facility] [place]  [address] [place] [address], [address]  [facility] [address]  Phone:  [phone]  Fax:  [phone]    Date: 10/30/2019    Patient: Lauren Nuñez  YOB: 1985  MRN: [id number]     Time Calculation  Start time: 0500  Stop time: 0545 Time Calculation (min): 45 minutes       Chief Complaint: left leg/ back problem      Visit #: 4    SUBJECTIVE:  Pt reports increased pain for 3 days. Pain going down the R leg just like previously on the left. Pt reports no change in activities or exercises. Pain is still worse with standing. Pt reports pain about 6/10 at max since last visit. Reports that taking IBU to be completely relieving.      OBJECTIVE:  Current objective measures: MMT 4/5 lower abdominal. Pain into extension in supine/standing. - slump, - SLR            Therapeutic Exercises (CPT 55862):     1. Supine marching, lvl 2 without cuff, lvl 3 x 25    2. Basic TrA with LE lift, x25    3. Ball bridges, x20 with posterior tilt with differernt ball heights, no change in pain    4. Basic TrA with ball roll, x25    5. Cat camel, x20    6. Prayer stretch, 30 sec x 3    Therapeutic Treatments and Modalities:     1. E Stim Unattended (CPT 25562), IFC and CP in supine x 15min       Time-based treatments/modalities:  Therapeutic exercise minutes (CPT 29894): 25 minutes       Pain rating before treatment: 0  Pain rating after treatment: 0    ASSESSMENT:   Response to treatment: pain overall increased and non responsive to movement today. Pt to continue HEP as long as non irritating and initiate prone press ups in one week if not improved or to contact her PCP if worsened.  Pt reports no increased pain during exercises.     PLAN/RECOMMENDATIONS:   Plan for treatment: therapy treatment to continue next visit.  Planned interventions for next visit: continue with current treatment.

## 2019-11-01 ENCOUNTER — APPOINTMENT (OUTPATIENT)
Dept: PHYSICAL THERAPY | Facility: REHABILITATION | Age: 34
End: 2019-11-01
Attending: NURSE PRACTITIONER
Payer: COMMERCIAL

## 2019-11-14 ENCOUNTER — PHYSICAL THERAPY (OUTPATIENT)
Dept: PHYSICAL THERAPY | Facility: REHABILITATION | Age: 34
End: 2019-11-14
Attending: NURSE PRACTITIONER
Payer: COMMERCIAL

## 2019-11-14 DIAGNOSIS — M54.50 LOW BACK PAIN, UNSPECIFIED BACK PAIN LATERALITY, UNSPECIFIED CHRONICITY, UNSPECIFIED WHETHER SCIATICA PRESENT: ICD-10-CM

## 2019-11-14 PROCEDURE — 97110 THERAPEUTIC EXERCISES: CPT

## 2019-11-14 NOTE — OP THERAPY DAILY TREATMENT
Outpatient Physical Therapy  DAILY TREATMENT     [facility] [place]  [address] [place] [address], [address]  [facility] [address]  Phone:  [phone]  Fax:  [phone]    Date: 11/14/2019    Patient: Lauren Nuñez  YOB: 1985  MRN: [id number]     Time Calculation  Start time: 0300  Stop time: 0323 Time Calculation (min): 23 minutes       Chief Complaint: left leg/ back problem      Visit #: 5    SUBJECTIVE:  No pain lately. Had relief within 2 days of last visit. Had no pain with recent hike over the weekend. Pain is reduced with standing or standing after sitting for a while.        OBJECTIVE:  Current objective measures: MMT 4/5 lower abdominal. - slump, - SLR            Therapeutic Exercises (CPT 86181):       Therapeutic Exercise Summary: HEP instruction/performance and development. Handout provided and exercises located below:  Access Code: [email or web address]   URL: [email or web address]   Date: 11/14/2019   Prepared by: Jevon Finney      Program Notes   if symptoms increase please do not hesitate to call the office. [phone]. no pain to be reproduced with exercises.     Exercises  · Supine Sciatic Nerve Glide - 10 reps - 2x daily  · Supine Pelvic Tilt - 30 reps - 2x daily  · Child's Pose Stretch - 2 reps - 30 hold - 2x daily  · Supine Transversus Abdominis Bracing with Leg Extension - 10 reps - 3 sets - 1x daily  · Supine March with Alternating Leg Lifts - 10 reps - 3 sets - 1x daily  · Neutral Curl Up with Straight Leg - 10 reps - 3 sets - 1x daily  · Supine Dead Bug with Leg Extension - 10 reps - 3 sets - 1x daily  · Supine Bridge with Pelvic Floor Contraction on Swiss Ball - 10 reps - 2 sets - 3x weekly        Time-based treatments/modalities:  Therapeutic exercise minutes (CPT 27552): 23 minutes       Pain rating before treatment: 0  Pain rating after treatment: 0    ASSESSMENT:   Response to treatment: GOALS met. Pt to trial independence with HEP for 30 days. F/u within  that time frame.     PLAN/RECOMMENDATIONS:   Plan for treatment: therapy treatment to continue next visit.  Planned interventions for next visit: continue with current treatment.

## 2019-11-30 ENCOUNTER — HOSPITAL ENCOUNTER (OUTPATIENT)
Dept: LAB | Facility: MEDICAL CENTER | Age: 34
End: 2019-11-30
Attending: PHYSICIAN ASSISTANT
Payer: COMMERCIAL

## 2019-11-30 LAB
T4 FREE SERPL-MCNC: 0.95 NG/DL (ref 0.53–1.43)
TSH SERPL DL<=0.005 MIU/L-ACNC: 2.35 UIU/ML (ref 0.38–5.33)

## 2019-11-30 PROCEDURE — 84443 ASSAY THYROID STIM HORMONE: CPT

## 2019-11-30 PROCEDURE — 84439 ASSAY OF FREE THYROXINE: CPT

## 2019-11-30 PROCEDURE — 36415 COLL VENOUS BLD VENIPUNCTURE: CPT

## 2019-12-11 ENCOUNTER — OFFICE VISIT (OUTPATIENT)
Dept: MEDICAL GROUP | Facility: PHYSICIAN GROUP | Age: 34
End: 2019-12-11
Payer: COMMERCIAL

## 2019-12-11 VITALS
RESPIRATION RATE: 16 BRPM | SYSTOLIC BLOOD PRESSURE: 122 MMHG | HEART RATE: 67 BPM | DIASTOLIC BLOOD PRESSURE: 72 MMHG | BODY MASS INDEX: 25.48 KG/M2 | WEIGHT: 178 LBS | OXYGEN SATURATION: 95 % | TEMPERATURE: 99.9 F | HEIGHT: 70 IN

## 2019-12-11 DIAGNOSIS — R10.9 FLANK PAIN: ICD-10-CM

## 2019-12-11 DIAGNOSIS — M25.50 ARTHRALGIA, UNSPECIFIED JOINT: ICD-10-CM

## 2019-12-11 LAB
APPEARANCE UR: CLEAR
BILIRUB UR STRIP-MCNC: NORMAL MG/DL
COLOR UR AUTO: YELLOW
GLUCOSE UR STRIP.AUTO-MCNC: NORMAL MG/DL
KETONES UR STRIP.AUTO-MCNC: 80 MG/DL
LEUKOCYTE ESTERASE UR QL STRIP.AUTO: NORMAL
NITRITE UR QL STRIP.AUTO: NORMAL
PH UR STRIP.AUTO: 5 [PH] (ref 5–8)
PROT UR QL STRIP: NORMAL MG/DL
RBC UR QL AUTO: NORMAL
SP GR UR STRIP.AUTO: 1.03
UROBILINOGEN UR STRIP-MCNC: 0.2 MG/DL

## 2019-12-11 PROCEDURE — 81002 URINALYSIS NONAUTO W/O SCOPE: CPT | Performed by: NURSE PRACTITIONER

## 2019-12-11 PROCEDURE — 99214 OFFICE O/P EST MOD 30 MIN: CPT | Performed by: NURSE PRACTITIONER

## 2019-12-11 RX ORDER — METHYLPREDNISOLONE 4 MG/1
TABLET ORAL
Qty: 21 TAB | Refills: 0 | Status: SHIPPED | OUTPATIENT
Start: 2019-12-11

## 2019-12-11 RX ORDER — LEVOTHYROXINE SODIUM 75 UG/1
1 CAPSULE ORAL
Refills: 0 | COMMUNITY
Start: 2019-10-09

## 2019-12-11 NOTE — PROGRESS NOTES
Chief Complaint   Patient presents with   • Low Back Pain       HISTORY OF PRESENT ILLNESS: Patient is a 34 y.o. female established patient who presents today to discuss the following issues:    Flank pain  Reports intermittent flank pain.  She was concerned that she had a kidney infection.  POCT UA normal.  She believes that it could be musculoskeletal in nature.      Arthralgia  Has been having lots of issues with joint pain.  Discussed plan to proceed with labs, followed by a medrol dose henrietta.        Patient Active Problem List    Diagnosis Date Noted   • Flank pain 2019   • Arthralgia 2019   • Low back pain 2019   • Palpitations 2019   • Anxiety 2014   • Incomplete RBBB 2012   • Hypothyroid 2012       Allergies:Patient has no known allergies.    Current Outpatient Medications   Medication Sig Dispense Refill   • TIROSINT 75 MCG Cap Take 1 Cap by mouth every day.  0   • methylPREDNISolone (MEDROL DOSEPAK) 4 MG Tablet Therapy Pack As directed on the packaging label. 21 Tab 0     No current facility-administered medications for this visit.        Social History     Tobacco Use   • Smoking status: Former Smoker     Packs/day: 0.00     Last attempt to quit: 2010     Years since quittin.0   • Smokeless tobacco: Never Used   Substance Use Topics   • Alcohol use: No   • Drug use: No       Family Status   Relation Name Status   • Mo  Alive   • Fa  Alive   • Sis  Alive   • Bro  Alive   • Bro  Alive     Family History   Problem Relation Age of Onset   • Hypertension Father        Review of Systems:   Constitutional: Negative for fever, chills, weight loss and malaise/fatigue.   HENT: Negative for ear pain, nosebleeds, congestion, sore throat and neck pain.    Eyes: Negative for blurred vision.   Respiratory: Negative for cough, sputum production, shortness of breath and wheezing.    Cardiovascular: Negative for chest pain, palpitations, orthopnea and leg swelling.  "  Gastrointestinal: Negative for heartburn, nausea, vomiting and abdominal pain.   Genitourinary: Negative for dysuria, urgency and frequency.   Musculoskeletal: Positive for back pain and joint pain.  Skin: Negative for rash and itching.   Neurological: Negative for dizziness, tingling, tremors, sensory change, focal weakness and headaches.   Endo/Heme/Allergies: Does not bruise/bleed easily.   Psychiatric/Behavioral: Negative for depression, suicidal ideas and memory loss.  The patient is not nervous/anxious and does not have insomnia.    All other systems reviewed and are negative except as in HPI.    Exam:  Blood Pressure 122/72   Pulse 67   Temperature 37.7 °C (99.9 °F)   Respiration 16   Height 1.778 m (5' 10\")   Weight 80.7 kg (178 lb)   Oxygen Saturation 95%   General:  Well nourished, well developed female in NAD  Head: Grossly normal.  Neck: Supple without JVD or bruit. Thyroid is not enlarged.  Pulmonary: Clear to ausculation. Normal effort. No rales, ronchi, or wheezing.  Cardiovascular: Regular rate and rhythm without murmur.   Abdomen:  Soft, nontender, nondistended.  Extremities: No clubbing, cyanosis, or edema.  Skin: Intact with no obvious rashes or lesions.  Neuro: Grossly intact.  Psych: Alert and oriented x 3.  Mood and affect appropriate.    Medical decision-making and discussion: Ceci is here today to discuss pain.  Lab work was ordered and medrol was sent to her pharmacy.  She will follow-up here as needed.          Assessment/Plan:  1. Flank pain  POCT Urinalysis   2. Arthralgia, unspecified joint  WESTERGREN SED RATE    Comp Metabolic Panel    VITAMIN D,25 HYDROXY    RHEUMATOID ARTHRITIS FACTOR    CCP ANTIBODY    methylPREDNISolone (MEDROL DOSEPAK) 4 MG Tablet Therapy Pack       Return if symptoms worsen or fail to improve.    Please note that this dictation was created using voice recognition software. I have made every reasonable attempt to correct obvious errors, but I expect that " there are errors of grammar and possibly content that I did not discover before finalizing the note.

## 2019-12-17 ENCOUNTER — TELEPHONE (OUTPATIENT)
Dept: PHYSICAL THERAPY | Facility: REHABILITATION | Age: 34
End: 2019-12-17

## 2019-12-17 NOTE — OP THERAPY DISCHARGE SUMMARY
Outpatient Physical Therapy  DISCHARGE SUMMARY NOTE      [facility] [place]  [address], [address]  [facility] [address]  Phone:  [phone]  Fax:  [phone]    Date of Visit: 12/17/2019    Patient: Lauren Nuñez  YOB: 1985  MRN: [id number]     Referring Provider: DONAL Wilcox   Referring Diagnosis Low back pain, unspecified back pain laterality, unspecified chronicity, with sciatica presence unspecified [M54.5]     Physical Therapy Occurrence Codes    Date physical therapy care plan established or reviewed:  10/2/19   Date physical therapy treatment started:  10/2/19          Functional Assessment Used    LBDI 0% at last visit    Your patient is being discharged from Physical Therapy with the following comments:   · Goals met    Comments:  Ceci Nuñez has completed 5 physical therapy sessions on her current prescription. She has improved function, decreased pain consistent ROM, and she continues to progress with her home exercise program. She was given 30 days to trial independence with HEP and instructed to return to the clinic if symptoms return or worsen. She did not return to the clinic. Recommend to discharge patient to full independent home exercise program at this time. Thank you for the opportunity to assist you and your patient.         Limitations Remaining:  none    Recommendations:  Continue HEP    Jevon Finney, PT, DPT    Date: 12/17/2019

## 2019-12-21 ENCOUNTER — HOSPITAL ENCOUNTER (OUTPATIENT)
Dept: LAB | Facility: MEDICAL CENTER | Age: 34
End: 2019-12-21
Attending: NURSE PRACTITIONER
Payer: COMMERCIAL

## 2019-12-21 DIAGNOSIS — M25.50 ARTHRALGIA, UNSPECIFIED JOINT: ICD-10-CM

## 2019-12-21 LAB
25(OH)D3 SERPL-MCNC: 42 NG/ML (ref 30–100)
ALBUMIN SERPL BCP-MCNC: 4.5 G/DL (ref 3.2–4.9)
ALBUMIN/GLOB SERPL: 1.6 G/DL
ALP SERPL-CCNC: 38 U/L (ref 30–99)
ALT SERPL-CCNC: 11 U/L (ref 2–50)
ANION GAP SERPL CALC-SCNC: 8 MMOL/L (ref 0–11.9)
AST SERPL-CCNC: 14 U/L (ref 12–45)
BILIRUB SERPL-MCNC: 0.5 MG/DL (ref 0.1–1.5)
BUN SERPL-MCNC: 17 MG/DL (ref 8–22)
CALCIUM SERPL-MCNC: 9.3 MG/DL (ref 8.5–10.5)
CHLORIDE SERPL-SCNC: 107 MMOL/L (ref 96–112)
CO2 SERPL-SCNC: 22 MMOL/L (ref 20–33)
CREAT SERPL-MCNC: 0.82 MG/DL (ref 0.5–1.4)
ERYTHROCYTE [SEDIMENTATION RATE] IN BLOOD BY WESTERGREN METHOD: 4 MM/HOUR (ref 0–20)
GLOBULIN SER CALC-MCNC: 2.8 G/DL (ref 1.9–3.5)
GLUCOSE SERPL-MCNC: 88 MG/DL (ref 65–99)
POTASSIUM SERPL-SCNC: 4.3 MMOL/L (ref 3.6–5.5)
PROT SERPL-MCNC: 7.3 G/DL (ref 6–8.2)
RHEUMATOID FACT SER IA-ACNC: <10 IU/ML (ref 0–14)
SODIUM SERPL-SCNC: 137 MMOL/L (ref 135–145)

## 2019-12-21 PROCEDURE — 80053 COMPREHEN METABOLIC PANEL: CPT

## 2019-12-21 PROCEDURE — 36415 COLL VENOUS BLD VENIPUNCTURE: CPT

## 2019-12-21 PROCEDURE — 86200 CCP ANTIBODY: CPT

## 2019-12-21 PROCEDURE — 85652 RBC SED RATE AUTOMATED: CPT

## 2019-12-21 PROCEDURE — 86431 RHEUMATOID FACTOR QUANT: CPT

## 2019-12-21 PROCEDURE — 82306 VITAMIN D 25 HYDROXY: CPT

## 2019-12-23 LAB — CCP IGG SERPL-ACNC: 4 UNITS (ref 0–19)

## 2019-12-31 PROBLEM — R10.9 FLANK PAIN: Status: ACTIVE | Noted: 2019-12-31

## 2019-12-31 PROBLEM — M25.50 ARTHRALGIA: Status: ACTIVE | Noted: 2019-12-31

## 2020-01-01 NOTE — ASSESSMENT & PLAN NOTE
Reports intermittent flank pain.  She was concerned that she had a kidney infection.  POCT UA normal.  She believes that it could be musculoskeletal in nature.

## 2020-01-01 NOTE — ASSESSMENT & PLAN NOTE
Has been having lots of issues with joint pain.  Discussed plan to proceed with labs, followed by a medrol dose henrietta.

## 2020-02-27 ENCOUNTER — HOSPITAL ENCOUNTER (OUTPATIENT)
Dept: LAB | Facility: MEDICAL CENTER | Age: 35
End: 2020-02-27
Attending: PHYSICIAN ASSISTANT
Payer: COMMERCIAL

## 2020-02-27 LAB
T4 FREE SERPL-MCNC: 1.14 NG/DL (ref 0.53–1.43)
TSH SERPL DL<=0.005 MIU/L-ACNC: 1.56 UIU/ML (ref 0.38–5.33)

## 2020-02-27 PROCEDURE — 84443 ASSAY THYROID STIM HORMONE: CPT

## 2020-02-27 PROCEDURE — 84439 ASSAY OF FREE THYROXINE: CPT

## 2020-02-27 PROCEDURE — 36415 COLL VENOUS BLD VENIPUNCTURE: CPT

## 2020-11-25 ENCOUNTER — HOSPITAL ENCOUNTER (OUTPATIENT)
Dept: LAB | Facility: MEDICAL CENTER | Age: 35
End: 2020-11-25
Payer: COMMERCIAL

## 2020-11-25 PROCEDURE — U0003 INFECTIOUS AGENT DETECTION BY NUCLEIC ACID (DNA OR RNA); SEVERE ACUTE RESPIRATORY SYNDROME CORONAVIRUS 2 (SARS-COV-2) (CORONAVIRUS DISEASE [COVID-19]), AMPLIFIED PROBE TECHNIQUE, MAKING USE OF HIGH THROUGHPUT TECHNOLOGIES AS DESCRIBED BY CMS-2020-01-R: HCPCS

## 2020-11-26 LAB
COVID ORDER STATUS COVID19: NORMAL
SARS-COV-2 RNA RESP QL NAA+PROBE: NOTDETECTED
SPECIMEN SOURCE: NORMAL

## 2021-05-18 ENCOUNTER — OFFICE VISIT (OUTPATIENT)
Dept: URGENT CARE | Facility: PHYSICIAN GROUP | Age: 36
End: 2021-05-18
Payer: COMMERCIAL

## 2021-05-18 VITALS
OXYGEN SATURATION: 99 % | WEIGHT: 176 LBS | DIASTOLIC BLOOD PRESSURE: 82 MMHG | HEIGHT: 70 IN | HEART RATE: 63 BPM | SYSTOLIC BLOOD PRESSURE: 124 MMHG | BODY MASS INDEX: 25.2 KG/M2 | TEMPERATURE: 99.2 F | RESPIRATION RATE: 14 BRPM

## 2021-05-18 DIAGNOSIS — R00.2 INTERMITTENT PALPITATIONS: ICD-10-CM

## 2021-05-18 DIAGNOSIS — E03.9 HYPOTHYROIDISM, UNSPECIFIED TYPE: ICD-10-CM

## 2021-05-18 PROCEDURE — 99214 OFFICE O/P EST MOD 30 MIN: CPT | Performed by: NURSE PRACTITIONER

## 2021-05-19 NOTE — PROGRESS NOTES
Subjective:      Lauren Nuñez is a 35 y.o. female who presents with Palpitations (for a few day, denies chest pains.)          This is a pleasant 35-year-old female who presents today with several days of intermittent heart palpitations.  She does have a history of similar episodes who she has been seen by cardiology and had full cardiac work-up which she reports was negative.  She denies any associated chest pain, shortness of breath, dizziness, syncope.  She does have a history of hypothyroidism.  Last TSH was in March and was within normal limits.  She is followed by endocrinology for this.  No substance use.  She does not use excessive caffeine.  Palpitations   This is a new problem. Episode onset: x 3 days. The problem occurs intermittently. The problem has been resolved. The symptoms are aggravated by thyroid drugs. Pertinent negatives include no anxiety, chest fullness, chest pain, coughing, diaphoresis, dizziness, fever, irregular heartbeat, malaise/fatigue, nausea, near-syncope, numbness, shortness of breath, syncope, vomiting or weakness. She has tried nothing for the symptoms. The treatment provided no relief. There is no history of anemia, anxiety, heart disease, hyperthyroidism or a valve disorder.       Review of Systems   Constitutional: Negative for chills, diaphoresis, fever and malaise/fatigue.   HENT: Negative for ear pain and sore throat.    Respiratory: Negative for cough, shortness of breath and wheezing.    Cardiovascular: Positive for palpitations. Negative for chest pain, orthopnea, leg swelling, syncope and near-syncope.   Gastrointestinal: Negative for constipation, diarrhea, heartburn, nausea and vomiting.   Musculoskeletal: Negative for back pain, joint pain and myalgias.   Skin: Negative for rash.   Neurological: Negative for dizziness, tingling, weakness, numbness and headaches.   Psychiatric/Behavioral: Negative for memory loss and suicidal ideas. The patient is not  "nervous/anxious.    All other systems reviewed and are negative.         Objective:     /82   Pulse 63   Temp 37.3 °C (99.2 °F)   Resp 14   Ht 1.778 m (5' 10\")   Wt 79.8 kg (176 lb)   SpO2 99%   BMI 25.25 kg/m²      Physical Exam  Vitals reviewed.   Constitutional:       General: She is not in acute distress.     Appearance: Normal appearance. She is not ill-appearing or diaphoretic.   HENT:      Head: Normocephalic and atraumatic.      Right Ear: External ear normal.      Left Ear: External ear normal.      Nose: No congestion or rhinorrhea.      Mouth/Throat:      Pharynx: No oropharyngeal exudate.   Eyes:      Extraocular Movements: Extraocular movements intact.      Conjunctiva/sclera: Conjunctivae normal.      Pupils: Pupils are equal, round, and reactive to light.   Cardiovascular:      Rate and Rhythm: Normal rate and regular rhythm.  No extrasystoles are present.     Pulses: Normal pulses.      Heart sounds: Heart sounds not distant. No murmur heard.   No friction rub. No gallop. No S3 or S4 sounds.    Pulmonary:      Effort: Pulmonary effort is normal. No respiratory distress.      Breath sounds: Normal breath sounds. No wheezing, rhonchi or rales.   Abdominal:      General: Abdomen is flat. Bowel sounds are normal.      Palpations: Abdomen is soft.      Tenderness: There is no abdominal tenderness.   Musculoskeletal:         General: No tenderness. Normal range of motion.      Cervical back: Normal range of motion and neck supple.      Right lower leg: No edema.      Left lower leg: No edema.   Lymphadenopathy:      Cervical: No cervical adenopathy.   Skin:     General: Skin is warm and dry.   Neurological:      Mental Status: She is alert and oriented to person, place, and time.   Psychiatric:         Mood and Affect: Mood normal.         Behavior: Behavior normal.     Results for ROSHNI VELA (MRN [id number]) as of 5/21/2021 08:06   Ref. Range 5/20/2021 04:04   WBC Latest Ref Range: " 3.4 - 10.8 x10E3/uL 6.4   RBC Latest Ref Range: 3.77 - 5.28 x10E6/uL 5.47 (H)   Hemoglobin Latest Ref Range: 11.1 - 15.9 g/dL 15.8   Hematocrit Latest Ref Range: 34.0 - 46.6 % 47.7 (H)   MCV Latest Ref Range: 79 - 97 fL 87   MCH Latest Ref Range: 26.6 - 33.0 pg 28.9   MCHC Latest Ref Range: 31.5 - 35.7 g/dL 33.1   RDW Latest Ref Range: 11.7 - 15.4 % 11.7   Platelet Count Latest Ref Range: 150 - 450 x10E3/uL 363   Immature Cells Unknown CANCELED   Neutrophils-Polys Latest Ref Range: Not Estab. % 55   Neutrophils (Absolute) Latest Ref Range: 1.4 - 7.0 x10E3/uL 3.5   Lymphocytes Latest Ref Range: Not Estab. % 32   Lymphs (Absolute) Latest Ref Range: 0.7 - 3.1 x10E3/uL 2.0   Monocytes Latest Ref Range: Not Estab. % 8   Monos (Absolute) Latest Ref Range: 0.1 - 0.9 x10E3/uL 0.5   Eosinophils Latest Ref Range: Not Estab. % 4   Eos (Absolute) Latest Ref Range: 0.0 - 0.4 x10E3/uL 0.3   Basophils Latest Ref Range: Not Estab. % 1   Baso (Absolute) Latest Ref Range: 0.0 - 0.2 x10E3/uL 0.1   Immature Granulocytes Latest Ref Range: Not Estab. % 0   Immature Granulocytes (abs) Latest Ref Range: 0.0 - 0.1 x10E3/uL 0.0   Nucleated RBC Unknown CANCELED   Comments-Diff Unknown CANCELED   Sodium Latest Ref Range: 134 - 144 mmol/L 139   Potassium Latest Ref Range: 3.5 - 5.2 mmol/L 4.6   Chloride Latest Ref Range: 96 - 106 mmol/L 105   Co2 Latest Ref Range: 20 - 29 mmol/L 22   Glucose Latest Ref Range: 65 - 99 mg/dL 98   Bun Latest Ref Range: 6 - 20 mg/dL 12   Creatinine Latest Ref Range: 0.57 - 1.00 mg/dL 0.79   GFR If  Latest Ref Range: >59 mL/min/1.73 113   GFR If Non  Latest Ref Range: >59 mL/min/1.73 98   Bun-Creatinine Ratio Latest Ref Range: 9 - 23  15   Calcium Latest Ref Range: 8.7 - 10.2 mg/dL 9.4   AST(SGOT) Latest Ref Range: 0 - 40 IU/L 10   ALT(SGPT) Latest Ref Range: 0 - 32 IU/L 6   Alkaline Phosphatase Latest Ref Range: 48 - 121 IU/L 54   Total Bilirubin Latest Ref Range: 0.0 - 1.2 mg/dL 0.5    Albumin Latest Ref Range: 3.8 - 4.8 g/dL 4.6   Total Protein Latest Ref Range: 6.0 - 8.5 g/dL 7.0   Globulin Latest Ref Range: 1.5 - 4.5 g/dL 2.4   A-G Ratio Latest Ref Range: 1.2 - 2.2  1.9   TSH Latest Ref Range: 0.450 - 4.500 uIU/mL 1.990          EKG Interpretation-HR is 63  there are no previous tracings available for comparison, normal sinus rhythm                Assessment/Plan:        1. Intermittent palpitations CBC WITH DIFFERENTIAL    Comp Metabolic Panel    TSH WITH REFLEX TO FT4    EKG  Referral to Cardiology   2. Hypothyroidism, unspecified type         Differential diagnosis, natural history, supportive care, and indications for immediate follow-up discussed at length.     Patient has chronic history of palpitattions which she has had full cardiac work-up for.  She was not currently having any symptoms while in the office so the EKG was unremarkable for any extrasystoles.  She no longer has a cardiologist and we have placed a referral today.  Will draw some labs and check her thyroid level.  She has recently lost 10 pounds and we discussed possibility that that may have altered her medication.  She is in no acute distress.  Her vitals are normal.  She is not experiencing any chest pain or shortness of breath.    Supportive care, differential diagnoses, and indications for immediate follow-up discussed with patient.    Pathogenesis of diagnosis discussed including typical length and natural progression. Patient expresses understanding and agrees to plan.     Instructed patient to follow up with PCP or return to clinic for worsening symptoms or symptoms that persist for 7 to 10 days     Please note that this dictation was created using voice recognition software. I have made every reasonable attempt to correct obvious errors, but I expect that there are errors of grammar and possibly content that I did not discover before finalizing the note.

## 2021-05-21 LAB
ALBUMIN SERPL-MCNC: 4.6 G/DL (ref 3.8–4.8)
ALBUMIN/GLOB SERPL: 1.9 {RATIO} (ref 1.2–2.2)
ALP SERPL-CCNC: 54 IU/L (ref 48–121)
ALT SERPL-CCNC: 6 IU/L (ref 0–32)
AST SERPL-CCNC: 10 IU/L (ref 0–40)
BASOPHILS # BLD AUTO: 0.1 X10E3/UL (ref 0–0.2)
BASOPHILS NFR BLD AUTO: 1 %
BILIRUB SERPL-MCNC: 0.5 MG/DL (ref 0–1.2)
BUN SERPL-MCNC: 12 MG/DL (ref 6–20)
BUN/CREAT SERPL: 15 (ref 9–23)
CALCIUM SERPL-MCNC: 9.4 MG/DL (ref 8.7–10.2)
CHLORIDE SERPL-SCNC: 105 MMOL/L (ref 96–106)
CO2 SERPL-SCNC: 22 MMOL/L (ref 20–29)
CREAT SERPL-MCNC: 0.79 MG/DL (ref 0.57–1)
EOSINOPHIL # BLD AUTO: 0.3 X10E3/UL (ref 0–0.4)
EOSINOPHIL NFR BLD AUTO: 4 %
ERYTHROCYTE [DISTWIDTH] IN BLOOD BY AUTOMATED COUNT: 11.7 % (ref 11.7–15.4)
GLOBULIN SER CALC-MCNC: 2.4 G/DL (ref 1.5–4.5)
GLUCOSE SERPL-MCNC: 98 MG/DL (ref 65–99)
HCT VFR BLD AUTO: 47.7 % (ref 34–46.6)
HGB BLD-MCNC: 15.8 G/DL (ref 11.1–15.9)
IMM GRANULOCYTES # BLD AUTO: 0 X10E3/UL (ref 0–0.1)
IMM GRANULOCYTES NFR BLD AUTO: 0 %
IMMATURE CELLS  115398: ABNORMAL
LYMPHOCYTES # BLD AUTO: 2 X10E3/UL (ref 0.7–3.1)
LYMPHOCYTES NFR BLD AUTO: 32 %
MCH RBC QN AUTO: 28.9 PG (ref 26.6–33)
MCHC RBC AUTO-ENTMCNC: 33.1 G/DL (ref 31.5–35.7)
MCV RBC AUTO: 87 FL (ref 79–97)
MONOCYTES # BLD AUTO: 0.5 X10E3/UL (ref 0.1–0.9)
MONOCYTES NFR BLD AUTO: 8 %
MORPHOLOGY BLD-IMP: ABNORMAL
NEUTROPHILS # BLD AUTO: 3.5 X10E3/UL (ref 1.4–7)
NEUTROPHILS NFR BLD AUTO: 55 %
NRBC BLD AUTO-RTO: ABNORMAL %
PLATELET # BLD AUTO: 363 X10E3/UL (ref 150–450)
POTASSIUM SERPL-SCNC: 4.6 MMOL/L (ref 3.5–5.2)
PROT SERPL-MCNC: 7 G/DL (ref 6–8.5)
RBC # BLD AUTO: 5.47 X10E6/UL (ref 3.77–5.28)
SODIUM SERPL-SCNC: 139 MMOL/L (ref 134–144)
TSH SERPL DL<=0.005 MIU/L-ACNC: 1.99 UIU/ML (ref 0.45–4.5)
WBC # BLD AUTO: 6.4 X10E3/UL (ref 3.4–10.8)

## 2021-05-21 ASSESSMENT — ENCOUNTER SYMPTOMS
TINGLING: 0
NERVOUS/ANXIOUS: 0
IRREGULAR HEARTBEAT: 0
COUGH: 0
NAUSEA: 0
VOMITING: 0
SYNCOPE: 0
BACK PAIN: 0
WHEEZING: 0
CHEST PRESSURE: 0
SORE THROAT: 0
HEARTBURN: 0
WEAKNESS: 0
DIZZINESS: 0
CONSTIPATION: 0
DIAPHORESIS: 0
NEAR-SYNCOPE: 0
MEMORY LOSS: 0
MYALGIAS: 0
DIARRHEA: 0
HEADACHES: 0
FEVER: 0
CHILLS: 0
PALPITATIONS: 1
SHORTNESS OF BREATH: 0
ORTHOPNEA: 0
NUMBNESS: 0

## 2021-05-24 ENCOUNTER — TELEPHONE (OUTPATIENT)
Dept: CARDIOLOGY | Facility: MEDICAL CENTER | Age: 36
End: 2021-05-24

## 2021-05-24 NOTE — TELEPHONE ENCOUNTER
Spoke to patient in regards to records for NP appointment with Dr. High on 06/02/2021 at 12:40pm. All recent records in epic including blood work, cardiac testing, and EKG. Confirmed appt date, time and location.

## 2021-06-02 ENCOUNTER — TELEMEDICINE (OUTPATIENT)
Dept: CARDIOLOGY | Facility: MEDICAL CENTER | Age: 36
End: 2021-06-02
Attending: NURSE PRACTITIONER
Payer: COMMERCIAL

## 2021-06-02 VITALS
BODY MASS INDEX: 25.2 KG/M2 | WEIGHT: 176 LBS | OXYGEN SATURATION: 97 % | HEART RATE: 92 BPM | SYSTOLIC BLOOD PRESSURE: 134 MMHG | HEIGHT: 70 IN | DIASTOLIC BLOOD PRESSURE: 80 MMHG

## 2021-06-02 DIAGNOSIS — R00.2 PALPITATIONS: ICD-10-CM

## 2021-06-02 PROCEDURE — 99204 OFFICE O/P NEW MOD 45 MIN: CPT | Performed by: INTERNAL MEDICINE

## 2021-06-02 ASSESSMENT — ENCOUNTER SYMPTOMS
WEIGHT GAIN: 0
COUGH: 0
DIARRHEA: 0
ORTHOPNEA: 0
WEIGHT LOSS: 0
CONSTIPATION: 0
DIZZINESS: 0
BLURRED VISION: 0
HEARTBURN: 0
DEPRESSION: 0
FEVER: 0
NEAR-SYNCOPE: 0
SYNCOPE: 0
FLANK PAIN: 0
DYSPNEA ON EXERTION: 0
IRREGULAR HEARTBEAT: 0
NAUSEA: 0
CLAUDICATION: 0
DECREASED APPETITE: 0
VOMITING: 0
PALPITATIONS: 0
ALTERED MENTAL STATUS: 0
BACK PAIN: 0
PND: 0
SHORTNESS OF BREATH: 0
ABDOMINAL PAIN: 0

## 2021-06-02 ASSESSMENT — FIBROSIS 4 INDEX: FIB4 SCORE: 0.38

## 2021-06-02 NOTE — PROGRESS NOTES
This encounter was conducted via ZOOM.  Verbal consent was obtained. Patient's identity was verified.    Cardiology Note    Chief Complaint   Patient presents with   • Palpitations   • Chest Pain     F/V Dx: Other chest pain       History of Present Illness: Ceci Nuñez is a 35 y.o. female who presents for palpitations.    Since referral, palpitations have now resolved. Had daily episodes for two weeks. No other palpitations. No symptoms over last week. Now less stressed than was previously during that time. Former smoker 5 pack years, quit 2010. She did use cocaine for a period in remote past but has been sober for many years. Denies other toxic social habits. She stays active. Does cardio about 3x weekly. Has eliptical along with uses free weights.       Review of Systems   Constitutional: Negative for decreased appetite, fever, malaise/fatigue, weight gain and weight loss.   HENT: Negative for congestion and nosebleeds.    Eyes: Negative for blurred vision.   Cardiovascular: Negative for chest pain, claudication, dyspnea on exertion, irregular heartbeat, leg swelling, near-syncope, orthopnea, palpitations, paroxysmal nocturnal dyspnea and syncope.   Respiratory: Negative for cough and shortness of breath.    Endocrine: Negative for cold intolerance and heat intolerance.   Skin: Negative for rash.   Musculoskeletal: Negative for back pain.   Gastrointestinal: Negative for abdominal pain, constipation, diarrhea, heartburn, melena, nausea and vomiting.   Genitourinary: Negative for dysuria, flank pain and hematuria.   Neurological: Negative for dizziness.   Psychiatric/Behavioral: Negative for altered mental status and depression.         Past Medical History:   Diagnosis Date   • Hypothyroid 12/12/2012         History reviewed. No pertinent surgical history.      Current Outpatient Medications   Medication Sig Dispense Refill   • TIROSINT 75 MCG Cap Take 1 Cap by mouth every day.  0   • methylPREDNISolone  (MEDROL DOSEPAK) 4 MG Tablet Therapy Pack As directed on the packaging label. (Patient not taking: Reported on 5/18/2021) 21 Tab 0     No current facility-administered medications for this visit.         No Known Allergies      Family History   Problem Relation Age of Onset   • Hypertension Father          Social History     Socioeconomic History   • Marital status: Single     Spouse name: Not on file   • Number of children: Not on file   • Years of education: Not on file   • Highest education level: Not on file   Occupational History   • Not on file   Tobacco Use   • Smoking status: Former Smoker     Packs/day: 0.00     Quit date: 12/12/2010     Years since quitting: 10.4   • Smokeless tobacco: Never Used   Substance and Sexual Activity   • Alcohol use: No   • Drug use: No   • Sexual activity: Yes     Partners: Male   Other Topics Concern   • Not on file   Social History Narrative   • Not on file     Social Determinants of Health     Financial Resource Strain:    • Difficulty of Paying Living Expenses:    Food Insecurity:    • Worried About Running Out of Food in the Last Year:    • Ran Out of Food in the Last Year:    Transportation Needs:    • Lack of Transportation (Medical):    • Lack of Transportation (Non-Medical):    Physical Activity:    • Days of Exercise per Week:    • Minutes of Exercise per Session:    Stress:    • Feeling of Stress :    Social Connections:    • Frequency of Communication with Friends and Family:    • Frequency of Social Gatherings with Friends and Family:    • Attends Church Services:    • Active Member of Clubs or Organizations:    • Attends Club or Organization Meetings:    • Marital Status:    Intimate Partner Violence:    • Fear of Current or Ex-Partner:    • Emotionally Abused:    • Physically Abused:    • Sexually Abused:          Physical Exam:  Ambulatory Vitals  /80 (BP Location: Right arm, Patient Position: Sitting, BP Cuff Size: Adult)   Pulse 92   Ht 1.778 m (5'  "10\")   Wt 79.8 kg (176 lb)   SpO2 97%    BP Readings from Last 4 Encounters:   06/02/21 134/80   05/18/21 124/82   12/11/19 122/72   08/27/19 110/60     Weight/BMI:   Vitals:    06/02/21 1247   BP: 134/80   Weight: 79.8 kg (176 lb)   Height: 1.778 m (5' 10\")    Body mass index is 25.25 kg/m².  Wt Readings from Last 4 Encounters:   06/02/21 79.8 kg (176 lb)   05/18/21 79.8 kg (176 lb)   12/11/19 80.7 kg (178 lb)   08/27/19 76.2 kg (168 lb)       Physical Exam  Physical Exam:  Constitutional: Alert, no distress, well-groomed.  Skin: No rashes in visible areas.  Eye: Round. Conjunctiva clear, lids normal. No icterus.   ENMT: Lips pink without lesions, good dentition, moist mucous membranes. Phonation normal.  Neck: No masses, no thyromegaly. Moves freely without pain.  CV: Pulse as reported by patient  Respiratory: Unlabored respiratory effort, no cough or audible wheeze  Psych: Alert and oriented x3, normal affect and mood.     Lab Data Review:  Lab Results   Component Value Date/Time    CHOLSTRLTOT 159 04/27/2019 07:26 AM    LDL 85 04/27/2019 07:26 AM    HDL 65 04/27/2019 07:26 AM    TRIGLYCERIDE 46 04/27/2019 07:26 AM       Lab Results   Component Value Date/Time    SODIUM 139 05/20/2021 04:04 AM    SODIUM 137 12/21/2019 08:25 AM    POTASSIUM 4.6 05/20/2021 04:04 AM    POTASSIUM 4.3 12/21/2019 08:25 AM    CHLORIDE 105 05/20/2021 04:04 AM    CHLORIDE 107 12/21/2019 08:25 AM    CO2 22 05/20/2021 04:04 AM    CO2 22 12/21/2019 08:25 AM    GLUCOSE 98 05/20/2021 04:04 AM    GLUCOSE 88 12/21/2019 08:25 AM    BUN 12 05/20/2021 04:04 AM    BUN 17 12/21/2019 08:25 AM    CREATININE 0.79 05/20/2021 04:04 AM    CREATININE 0.82 12/21/2019 08:25 AM    BUNCREATRAT 15 05/20/2021 04:04 AM     CrCl cannot be calculated (Patient's most recent lab result is older than the maximum 7 days allowed.).  Lab Results   Component Value Date/Time    ALKPHOSPHAT 54 05/20/2021 04:04 AM    ALKPHOSPHAT 38 12/21/2019 08:25 AM    ASTSGOT 10 " 05/20/2021 04:04 AM    ASTSGOT 14 12/21/2019 08:25 AM    ALTSGPT 6 05/20/2021 04:04 AM    ALTSGPT 11 12/21/2019 08:25 AM    TBILIRUBIN 0.5 05/20/2021 04:04 AM    TBILIRUBIN 0.5 12/21/2019 08:25 AM      Lab Results   Component Value Date/Time    WBC 6.4 05/20/2021 04:04 AM    WBC 4.5 (L) 04/27/2019 07:26 AM     Lab Results   Component Value Date/Time    HBA1C 5.3 06/06/2019 03:35 PM     No components found for: TROP      Cardiac Imaging and Procedures Review:      TTE 2012  -Normal left ventricular systolic function.  -no structural abnormalities.     Stress echo 2012  -negative for ischemia    Medical Decision Making:  Problem List Items Addressed This Visit     Palpitations        Continue to abstain from stimulants; including caffeine, if possible. Discussed long term monitoring with either apple watch or kardia device. If should have more palpitations can check device and relay information. Follow up as needed.    It was my pleasure to meet with Ms. Nuñez.

## 2022-05-06 ENCOUNTER — APPOINTMENT (RX ONLY)
Dept: URBAN - METROPOLITAN AREA CLINIC 22 | Facility: CLINIC | Age: 37
Setting detail: DERMATOLOGY
End: 2022-05-06

## 2022-05-06 DIAGNOSIS — D18.0 HEMANGIOMA: ICD-10-CM

## 2022-05-06 DIAGNOSIS — Z71.89 OTHER SPECIFIED COUNSELING: ICD-10-CM

## 2022-05-06 DIAGNOSIS — L81.4 OTHER MELANIN HYPERPIGMENTATION: ICD-10-CM

## 2022-05-06 DIAGNOSIS — D22 MELANOCYTIC NEVI: ICD-10-CM

## 2022-05-06 PROBLEM — D18.01 HEMANGIOMA OF SKIN AND SUBCUTANEOUS TISSUE: Status: ACTIVE | Noted: 2022-05-06

## 2022-05-06 PROBLEM — D22.22 MELANOCYTIC NEVI OF LEFT EAR AND EXTERNAL AURICULAR CANAL: Status: ACTIVE | Noted: 2022-05-06

## 2022-05-06 PROBLEM — D22.5 MELANOCYTIC NEVI OF TRUNK: Status: ACTIVE | Noted: 2022-05-06

## 2022-05-06 PROBLEM — D23.71 OTHER BENIGN NEOPLASM OF SKIN OF RIGHT LOWER LIMB, INCLUDING HIP: Status: ACTIVE | Noted: 2022-05-06

## 2022-05-06 PROCEDURE — 99203 OFFICE O/P NEW LOW 30 MIN: CPT

## 2022-05-06 PROCEDURE — ? SUNSCREEN RECOMMENDATIONS

## 2022-05-06 PROCEDURE — ? COUNSELING

## 2022-05-06 PROCEDURE — ? PHOTO-DOCUMENTATION

## 2022-05-06 ASSESSMENT — LOCATION DETAILED DESCRIPTION DERM
LOCATION DETAILED: LEFT PROXIMAL DORSAL FOREARM
LOCATION DETAILED: LEFT SUPERIOR HELIX
LOCATION DETAILED: SUPERIOR LUMBAR SPINE
LOCATION DETAILED: LEFT INFERIOR UPPER BACK
LOCATION DETAILED: LEFT LATERAL ABDOMEN
LOCATION DETAILED: RIGHT MEDIAL UPPER BACK

## 2022-05-06 ASSESSMENT — LOCATION SIMPLE DESCRIPTION DERM
LOCATION SIMPLE: RIGHT UPPER BACK
LOCATION SIMPLE: LEFT UPPER BACK
LOCATION SIMPLE: LEFT FOREARM
LOCATION SIMPLE: ABDOMEN
LOCATION SIMPLE: LEFT EAR
LOCATION SIMPLE: LOWER BACK

## 2022-05-06 ASSESSMENT — LOCATION ZONE DERM
LOCATION ZONE: ARM
LOCATION ZONE: EAR
LOCATION ZONE: TRUNK

## 2023-03-08 ENCOUNTER — PRE-ADMISSION TESTING (OUTPATIENT)
Dept: ADMISSIONS | Facility: MEDICAL CENTER | Age: 38
End: 2023-03-08
Attending: OBSTETRICS & GYNECOLOGY
Payer: COMMERCIAL

## 2023-03-08 DIAGNOSIS — Z01.812 PRE-OPERATIVE LABORATORY EXAMINATION: ICD-10-CM

## 2023-03-08 LAB
ANION GAP SERPL CALC-SCNC: 10 MMOL/L (ref 7–16)
BUN SERPL-MCNC: 14 MG/DL (ref 8–22)
CALCIUM SERPL-MCNC: 9.2 MG/DL (ref 8.5–10.5)
CHLORIDE SERPL-SCNC: 105 MMOL/L (ref 96–112)
CO2 SERPL-SCNC: 22 MMOL/L (ref 20–33)
CREAT SERPL-MCNC: 0.72 MG/DL (ref 0.5–1.4)
ERYTHROCYTE [DISTWIDTH] IN BLOOD BY AUTOMATED COUNT: 39.7 FL (ref 35.9–50)
GFR SERPLBLD CREATININE-BSD FMLA CKD-EPI: 111 ML/MIN/1.73 M 2
GLUCOSE SERPL-MCNC: 96 MG/DL (ref 65–99)
HCT VFR BLD AUTO: 45.4 % (ref 37–47)
HGB BLD-MCNC: 15 G/DL (ref 12–16)
MCH RBC QN AUTO: 28.3 PG (ref 27–33)
MCHC RBC AUTO-ENTMCNC: 33 G/DL (ref 33.6–35)
MCV RBC AUTO: 85.7 FL (ref 81.4–97.8)
PLATELET # BLD AUTO: 318 K/UL (ref 164–446)
PMV BLD AUTO: 9.7 FL (ref 9–12.9)
POTASSIUM SERPL-SCNC: 4.4 MMOL/L (ref 3.6–5.5)
RBC # BLD AUTO: 5.3 M/UL (ref 4.2–5.4)
SODIUM SERPL-SCNC: 137 MMOL/L (ref 135–145)
WBC # BLD AUTO: 5 K/UL (ref 4.8–10.8)

## 2023-03-08 PROCEDURE — 36415 COLL VENOUS BLD VENIPUNCTURE: CPT

## 2023-03-08 PROCEDURE — 85027 COMPLETE CBC AUTOMATED: CPT

## 2023-03-08 PROCEDURE — 80048 BASIC METABOLIC PNL TOTAL CA: CPT

## 2023-03-08 RX ORDER — LEVOTHYROXINE SODIUM 88 UG/1
88 TABLET ORAL
COMMUNITY

## 2023-03-27 ENCOUNTER — ANESTHESIA EVENT (OUTPATIENT)
Dept: SURGERY | Facility: MEDICAL CENTER | Age: 38
End: 2023-03-27
Payer: COMMERCIAL

## 2023-03-28 ENCOUNTER — ANESTHESIA (OUTPATIENT)
Dept: SURGERY | Facility: MEDICAL CENTER | Age: 38
End: 2023-03-28
Payer: COMMERCIAL

## 2023-03-28 ENCOUNTER — HOSPITAL ENCOUNTER (OUTPATIENT)
Facility: MEDICAL CENTER | Age: 38
End: 2023-03-28
Attending: OBSTETRICS & GYNECOLOGY | Admitting: OBSTETRICS & GYNECOLOGY
Payer: COMMERCIAL

## 2023-03-28 VITALS
HEART RATE: 72 BPM | OXYGEN SATURATION: 99 % | BODY MASS INDEX: 28.18 KG/M2 | WEIGHT: 196.87 LBS | RESPIRATION RATE: 16 BRPM | HEIGHT: 70 IN | TEMPERATURE: 97.4 F | DIASTOLIC BLOOD PRESSURE: 61 MMHG | SYSTOLIC BLOOD PRESSURE: 126 MMHG

## 2023-03-28 PROBLEM — K21.9 GASTROESOPHAGEAL REFLUX DISEASE: Status: ACTIVE | Noted: 2023-03-28

## 2023-03-28 LAB
HCG UR QL: NEGATIVE
PATHOLOGY CONSULT NOTE: NORMAL

## 2023-03-28 PROCEDURE — 160047 HCHG PACU  - EA ADDL 30 MINS PHASE II: Performed by: OBSTETRICS & GYNECOLOGY

## 2023-03-28 PROCEDURE — 700105 HCHG RX REV CODE 258: Performed by: ANESTHESIOLOGY

## 2023-03-28 PROCEDURE — 81025 URINE PREGNANCY TEST: CPT

## 2023-03-28 PROCEDURE — 160046 HCHG PACU - 1ST 60 MINS PHASE II: Performed by: OBSTETRICS & GYNECOLOGY

## 2023-03-28 PROCEDURE — 700101 HCHG RX REV CODE 250: Performed by: OBSTETRICS & GYNECOLOGY

## 2023-03-28 PROCEDURE — 700105 HCHG RX REV CODE 258: Performed by: OBSTETRICS & GYNECOLOGY

## 2023-03-28 PROCEDURE — 88307 TISSUE EXAM BY PATHOLOGIST: CPT

## 2023-03-28 PROCEDURE — 160031 HCHG SURGERY MINUTES - 1ST 30 MINS LEVEL 5: Performed by: OBSTETRICS & GYNECOLOGY

## 2023-03-28 PROCEDURE — 160048 HCHG OR STATISTICAL LEVEL 1-5: Performed by: OBSTETRICS & GYNECOLOGY

## 2023-03-28 PROCEDURE — 502714 HCHG ROBOTIC SURGERY SERVICES: Performed by: OBSTETRICS & GYNECOLOGY

## 2023-03-28 PROCEDURE — 700102 HCHG RX REV CODE 250 W/ 637 OVERRIDE(OP): Performed by: ANESTHESIOLOGY

## 2023-03-28 PROCEDURE — 700101 HCHG RX REV CODE 250: Performed by: ANESTHESIOLOGY

## 2023-03-28 PROCEDURE — 160025 RECOVERY II MINUTES (STATS): Performed by: OBSTETRICS & GYNECOLOGY

## 2023-03-28 PROCEDURE — 160002 HCHG RECOVERY MINUTES (STAT): Performed by: OBSTETRICS & GYNECOLOGY

## 2023-03-28 PROCEDURE — 700111 HCHG RX REV CODE 636 W/ 250 OVERRIDE (IP): Performed by: ANESTHESIOLOGY

## 2023-03-28 PROCEDURE — 00944 ANES VAG PX VAG HYSTERECTOMY: CPT | Performed by: ANESTHESIOLOGY

## 2023-03-28 PROCEDURE — 160009 HCHG ANES TIME/MIN: Performed by: OBSTETRICS & GYNECOLOGY

## 2023-03-28 PROCEDURE — 160036 HCHG PACU - EA ADDL 30 MINS PHASE I: Performed by: OBSTETRICS & GYNECOLOGY

## 2023-03-28 PROCEDURE — A9270 NON-COVERED ITEM OR SERVICE: HCPCS | Performed by: ANESTHESIOLOGY

## 2023-03-28 PROCEDURE — 700104 HCHG RX REV CODE 254: Performed by: OBSTETRICS & GYNECOLOGY

## 2023-03-28 PROCEDURE — 160042 HCHG SURGERY MINUTES - EA ADDL 1 MIN LEVEL 5: Performed by: OBSTETRICS & GYNECOLOGY

## 2023-03-28 PROCEDURE — 160035 HCHG PACU - 1ST 60 MINS PHASE I: Performed by: OBSTETRICS & GYNECOLOGY

## 2023-03-28 RX ORDER — LABETALOL HYDROCHLORIDE 5 MG/ML
5 INJECTION, SOLUTION INTRAVENOUS
Status: DISCONTINUED | OUTPATIENT
Start: 2023-03-28 | End: 2023-03-28 | Stop reason: HOSPADM

## 2023-03-28 RX ORDER — SODIUM CHLORIDE 9 MG/ML
INJECTION, SOLUTION INTRAVENOUS
Status: DISCONTINUED | OUTPATIENT
Start: 2023-03-28 | End: 2023-03-28 | Stop reason: SURG

## 2023-03-28 RX ORDER — ROCURONIUM BROMIDE 10 MG/ML
INJECTION, SOLUTION INTRAVENOUS PRN
Status: DISCONTINUED | OUTPATIENT
Start: 2023-03-28 | End: 2023-03-28 | Stop reason: SURG

## 2023-03-28 RX ORDER — SODIUM CHLORIDE, SODIUM LACTATE, POTASSIUM CHLORIDE, CALCIUM CHLORIDE 600; 310; 30; 20 MG/100ML; MG/100ML; MG/100ML; MG/100ML
INJECTION, SOLUTION INTRAVENOUS CONTINUOUS
Status: DISCONTINUED | OUTPATIENT
Start: 2023-03-28 | End: 2023-03-28 | Stop reason: HOSPADM

## 2023-03-28 RX ORDER — DIPHENHYDRAMINE HYDROCHLORIDE 50 MG/ML
12.5 INJECTION INTRAMUSCULAR; INTRAVENOUS
Status: DISCONTINUED | OUTPATIENT
Start: 2023-03-28 | End: 2023-03-28 | Stop reason: HOSPADM

## 2023-03-28 RX ORDER — ONDANSETRON 2 MG/ML
INJECTION INTRAMUSCULAR; INTRAVENOUS PRN
Status: DISCONTINUED | OUTPATIENT
Start: 2023-03-28 | End: 2023-03-28 | Stop reason: SURG

## 2023-03-28 RX ORDER — SCOLOPAMINE TRANSDERMAL SYSTEM 1 MG/1
1 PATCH, EXTENDED RELEASE TRANSDERMAL
Status: DISCONTINUED | OUTPATIENT
Start: 2023-03-28 | End: 2023-03-28 | Stop reason: HOSPADM

## 2023-03-28 RX ORDER — PROMETHAZINE HYDROCHLORIDE 25 MG/1
25 SUPPOSITORY RECTAL EVERY 4 HOURS PRN
Status: DISCONTINUED | OUTPATIENT
Start: 2023-03-28 | End: 2023-03-28 | Stop reason: HOSPADM

## 2023-03-28 RX ORDER — HALOPERIDOL 5 MG/ML
1 INJECTION INTRAMUSCULAR
Status: DISCONTINUED | OUTPATIENT
Start: 2023-03-28 | End: 2023-03-28 | Stop reason: HOSPADM

## 2023-03-28 RX ORDER — BUPIVACAINE HYDROCHLORIDE AND EPINEPHRINE 2.5; 5 MG/ML; UG/ML
INJECTION, SOLUTION EPIDURAL; INFILTRATION; INTRACAUDAL; PERINEURAL
Status: DISCONTINUED | OUTPATIENT
Start: 2023-03-28 | End: 2023-03-28 | Stop reason: HOSPADM

## 2023-03-28 RX ORDER — SODIUM CHLORIDE, SODIUM LACTATE, POTASSIUM CHLORIDE, CALCIUM CHLORIDE 600; 310; 30; 20 MG/100ML; MG/100ML; MG/100ML; MG/100ML
INJECTION, SOLUTION INTRAVENOUS CONTINUOUS
Status: ACTIVE | OUTPATIENT
Start: 2023-03-28 | End: 2023-03-28

## 2023-03-28 RX ORDER — HYDROMORPHONE HYDROCHLORIDE 1 MG/ML
0.2 INJECTION, SOLUTION INTRAMUSCULAR; INTRAVENOUS; SUBCUTANEOUS
Status: DISCONTINUED | OUTPATIENT
Start: 2023-03-28 | End: 2023-03-28 | Stop reason: HOSPADM

## 2023-03-28 RX ORDER — DEXAMETHASONE SODIUM PHOSPHATE 4 MG/ML
INJECTION, SOLUTION INTRA-ARTICULAR; INTRALESIONAL; INTRAMUSCULAR; INTRAVENOUS; SOFT TISSUE PRN
Status: DISCONTINUED | OUTPATIENT
Start: 2023-03-28 | End: 2023-03-28 | Stop reason: SURG

## 2023-03-28 RX ORDER — HYDROMORPHONE HYDROCHLORIDE 2 MG/ML
INJECTION, SOLUTION INTRAMUSCULAR; INTRAVENOUS; SUBCUTANEOUS PRN
Status: DISCONTINUED | OUTPATIENT
Start: 2023-03-28 | End: 2023-03-28 | Stop reason: SURG

## 2023-03-28 RX ORDER — LIDOCAINE HYDROCHLORIDE 20 MG/ML
INJECTION, SOLUTION EPIDURAL; INFILTRATION; INTRACAUDAL; PERINEURAL PRN
Status: DISCONTINUED | OUTPATIENT
Start: 2023-03-28 | End: 2023-03-28 | Stop reason: SURG

## 2023-03-28 RX ORDER — OXYCODONE HCL 5 MG/5 ML
5 SOLUTION, ORAL ORAL
Status: COMPLETED | OUTPATIENT
Start: 2023-03-28 | End: 2023-03-28

## 2023-03-28 RX ORDER — OXYCODONE HCL 5 MG/5 ML
10 SOLUTION, ORAL ORAL
Status: COMPLETED | OUTPATIENT
Start: 2023-03-28 | End: 2023-03-28

## 2023-03-28 RX ORDER — EPHEDRINE SULFATE 50 MG/ML
5 INJECTION, SOLUTION INTRAVENOUS
Status: DISCONTINUED | OUTPATIENT
Start: 2023-03-28 | End: 2023-03-28 | Stop reason: HOSPADM

## 2023-03-28 RX ORDER — ENOXAPARIN SODIUM 100 MG/ML
40 INJECTION SUBCUTANEOUS DAILY
COMMUNITY

## 2023-03-28 RX ORDER — HYDROMORPHONE HYDROCHLORIDE 1 MG/ML
0.1 INJECTION, SOLUTION INTRAMUSCULAR; INTRAVENOUS; SUBCUTANEOUS
Status: DISCONTINUED | OUTPATIENT
Start: 2023-03-28 | End: 2023-03-28 | Stop reason: HOSPADM

## 2023-03-28 RX ORDER — CEFAZOLIN SODIUM 1 G/3ML
INJECTION, POWDER, FOR SOLUTION INTRAMUSCULAR; INTRAVENOUS PRN
Status: DISCONTINUED | OUTPATIENT
Start: 2023-03-28 | End: 2023-03-28 | Stop reason: SURG

## 2023-03-28 RX ORDER — HYDRALAZINE HYDROCHLORIDE 20 MG/ML
5 INJECTION INTRAMUSCULAR; INTRAVENOUS
Status: DISCONTINUED | OUTPATIENT
Start: 2023-03-28 | End: 2023-03-28 | Stop reason: HOSPADM

## 2023-03-28 RX ORDER — ONDANSETRON 2 MG/ML
4 INJECTION INTRAMUSCULAR; INTRAVENOUS
Status: DISCONTINUED | OUTPATIENT
Start: 2023-03-28 | End: 2023-03-28 | Stop reason: HOSPADM

## 2023-03-28 RX ORDER — SCOLOPAMINE TRANSDERMAL SYSTEM 1 MG/1
1 PATCH, EXTENDED RELEASE TRANSDERMAL
Status: DISCONTINUED | OUTPATIENT
Start: 2023-03-28 | End: 2023-03-28

## 2023-03-28 RX ORDER — MEPERIDINE HYDROCHLORIDE 25 MG/ML
6.25 INJECTION INTRAMUSCULAR; INTRAVENOUS; SUBCUTANEOUS
Status: COMPLETED | OUTPATIENT
Start: 2023-03-28 | End: 2023-03-28

## 2023-03-28 RX ORDER — HYDROMORPHONE HYDROCHLORIDE 1 MG/ML
0.4 INJECTION, SOLUTION INTRAMUSCULAR; INTRAVENOUS; SUBCUTANEOUS
Status: DISCONTINUED | OUTPATIENT
Start: 2023-03-28 | End: 2023-03-28 | Stop reason: HOSPADM

## 2023-03-28 RX ADMIN — PROPOFOL 200 MG: 10 INJECTION, EMULSION INTRAVENOUS at 07:40

## 2023-03-28 RX ADMIN — ONDANSETRON 4 MG: 2 INJECTION INTRAMUSCULAR; INTRAVENOUS at 09:30

## 2023-03-28 RX ADMIN — CEFAZOLIN 2 G: 330 INJECTION, POWDER, FOR SOLUTION INTRAMUSCULAR; INTRAVENOUS at 07:45

## 2023-03-28 RX ADMIN — LIDOCAINE HYDROCHLORIDE 100 MG: 20 INJECTION, SOLUTION EPIDURAL; INFILTRATION; INTRACAUDAL at 07:40

## 2023-03-28 RX ADMIN — ROCURONIUM BROMIDE 20 MG: 10 INJECTION, SOLUTION INTRAVENOUS at 08:29

## 2023-03-28 RX ADMIN — HYDROMORPHONE HYDROCHLORIDE 0.2 MG: 2 INJECTION INTRAMUSCULAR; INTRAVENOUS; SUBCUTANEOUS at 08:22

## 2023-03-28 RX ADMIN — OXYCODONE HYDROCHLORIDE 10 MG: 5 SOLUTION ORAL at 10:25

## 2023-03-28 RX ADMIN — SODIUM CHLORIDE, POTASSIUM CHLORIDE, SODIUM LACTATE AND CALCIUM CHLORIDE: 600; 310; 30; 20 INJECTION, SOLUTION INTRAVENOUS at 06:40

## 2023-03-28 RX ADMIN — ROCURONIUM BROMIDE 5 MG: 10 INJECTION, SOLUTION INTRAVENOUS at 09:16

## 2023-03-28 RX ADMIN — HYDROMORPHONE HYDROCHLORIDE 0.2 MG: 2 INJECTION INTRAMUSCULAR; INTRAVENOUS; SUBCUTANEOUS at 09:35

## 2023-03-28 RX ADMIN — SODIUM CHLORIDE: 9 INJECTION, SOLUTION INTRAVENOUS at 09:21

## 2023-03-28 RX ADMIN — FENTANYL CITRATE 100 MCG: 50 INJECTION, SOLUTION INTRAMUSCULAR; INTRAVENOUS at 07:38

## 2023-03-28 RX ADMIN — MEPERIDINE HYDROCHLORIDE 6.25 MG: 25 INJECTION INTRAMUSCULAR; INTRAVENOUS; SUBCUTANEOUS at 10:21

## 2023-03-28 RX ADMIN — DEXAMETHASONE SODIUM PHOSPHATE 4 MG: 4 INJECTION, SOLUTION INTRA-ARTICULAR; INTRALESIONAL; INTRAMUSCULAR; INTRAVENOUS; SOFT TISSUE at 07:44

## 2023-03-28 RX ADMIN — MEPERIDINE HYDROCHLORIDE 6.25 MG: 25 INJECTION INTRAMUSCULAR; INTRAVENOUS; SUBCUTANEOUS at 10:16

## 2023-03-28 RX ADMIN — SUGAMMADEX 200 MG: 100 INJECTION, SOLUTION INTRAVENOUS at 09:39

## 2023-03-28 RX ADMIN — ROCURONIUM BROMIDE 5 MG: 10 INJECTION, SOLUTION INTRAVENOUS at 09:27

## 2023-03-28 RX ADMIN — HYDROMORPHONE HYDROCHLORIDE 0.2 MG: 2 INJECTION INTRAMUSCULAR; INTRAVENOUS; SUBCUTANEOUS at 09:31

## 2023-03-28 RX ADMIN — ROCURONIUM BROMIDE 60 MG: 10 INJECTION, SOLUTION INTRAVENOUS at 07:40

## 2023-03-28 RX ADMIN — FENTANYL CITRATE 50 MCG: 50 INJECTION, SOLUTION INTRAMUSCULAR; INTRAVENOUS at 10:33

## 2023-03-28 RX ADMIN — HYDROMORPHONE HYDROCHLORIDE 0.4 MG: 2 INJECTION INTRAMUSCULAR; INTRAVENOUS; SUBCUTANEOUS at 08:00

## 2023-03-28 RX ADMIN — MEPERIDINE HYDROCHLORIDE 6.25 MG: 25 INJECTION INTRAMUSCULAR; INTRAVENOUS; SUBCUTANEOUS at 09:55

## 2023-03-28 RX ADMIN — MEPERIDINE HYDROCHLORIDE 6.25 MG: 25 INJECTION INTRAMUSCULAR; INTRAVENOUS; SUBCUTANEOUS at 10:05

## 2023-03-28 RX ADMIN — FENTANYL CITRATE 50 MCG: 50 INJECTION, SOLUTION INTRAMUSCULAR; INTRAVENOUS at 10:40

## 2023-03-28 RX ADMIN — PROPOFOL 50 MG: 10 INJECTION, EMULSION INTRAVENOUS at 07:42

## 2023-03-28 ASSESSMENT — PAIN DESCRIPTION - PAIN TYPE
TYPE: SURGICAL PAIN

## 2023-03-28 ASSESSMENT — FIBROSIS 4 INDEX: FIB4 SCORE: 0.46

## 2023-03-28 NOTE — DISCHARGE INSTRUCTIONS
HOME CARE INSTRUCTIONS    ACTIVITY: Rest and take it easy for the first 24 hours.  A responsible adult is recommended to remain with you during that time.  It is normal to feel sleepy.  We encourage you to not do anything that requires balance, judgment or coordination.    FOR 24 HOURS DO NOT:  Drive, operate machinery or run household appliances.  Drink beer or alcoholic beverages.  Make important decisions or sign legal documents.    DIET: To avoid nausea, slowly advance diet as tolerated, avoiding spicy or greasy foods for the first day.  Add more substantial food to your diet according to your physician's instructions.  INCREASE FLUIDS AND FIBER TO AVOID CONSTIPATION.    MEDICATIONS: Resume taking daily medication.  Take prescribed pain medication with food.  If no medication is prescribed, you may take non-aspirin pain medication if needed.  PAIN MEDICATION CAN BE VERY CONSTIPATING.  Take a stool softener or laxative such as senokot, pericolace, or milk of magnesia if needed.    Last pain medication given at 10:25 am.    A follow-up appointment should be arranged with your doctor; call to schedule.    Scopolamine Patch-- you may remove in 3 days (on 3/31)   What side effects may I notice from receiving this medicine?  Side effects that you should report to your doctor or health care professional as soon as possible:  allergic reactions like skin rash, itching or hives; swelling of the face, lips, or tongue  blurred vision  changes in vision  confusion  dizziness  eye pain  fast, irregular heartbeat  hallucinations, loss of contact with reality  nausea, vomiting  pain or trouble passing urine  restlessness  seizures  skin irritation  stomach pain  Side effects that usually do not require medical attention (report to your doctor or health care professional if they continue or are bothersome):  drowsiness  dry mouth  headache  sore throat    You should CALL YOUR PHYSICIAN if you develop:  Fever greater than 101  degrees F.  Pain not relieved by medication, or persistent nausea or vomiting.  Excessive bleeding (blood soaking through dressing) or unexpected drainage from the wound.  Extreme redness or swelling around the incision site, drainage of pus or foul smelling drainage.  Inability to urinate or empty your bladder within 8 hours.  Problems with breathing or chest pain.    You should call 911 if you develop problems with breathing or chest pain.  If you are unable to contact your doctor or surgical center, you should go to the nearest emergency room or urgent care center.    Physician's telephone #: [phone] Dr Caro    MILD FLU-LIKE SYMPTOMS ARE NORMAL.  YOU MAY EXPERIENCE GENERALIZED MUSCLE ACHES, THROAT IRRITATION, HEADACHE AND/OR SOME NAUSEA.    If any questions arise, call your doctor.  If your doctor is not available, please feel free to call the Surgical Center at [phone].  The Center is open Monday through Friday from 7AM to 7PM.      A registered nurse may call you a few days after your surgery to see how you are doing after your procedure.    You may also receive a survey in the mail within the next two weeks and we ask that you take a few moments to complete the survey and return it to us.  Our goal is to provide you with very good care and we value your comments.

## 2023-03-28 NOTE — OR NURSING
Pt's VSS; denies N/V; states pain is at tolerable level. Dressing CDI to abdomen. D/c orders received. IV dc'd. Pt changed into clothing with assistance. Pt up and ambulated to BR, steady gait, voided adequately. Discharge instructions given; pt and family verbalized understanding and questions answered. Patient states ready to d/c home. No prescriptions given. Pt dc'd in w/c with RN in stable condition.

## 2023-03-28 NOTE — ANESTHESIA PREPROCEDURE EVALUATION
Case: [id number] Date/Time: 03/28/23 0715    Procedure: ROBOTIC ASSISTED LAPAROSCOPIC TOTAL HYSTERECTOMY POSSIBLE BILATERAL SALPINGO OOPHERECTOMY    Pre-op diagnosis: ENLARGED UTERUS, MENORRHAGIA, FIBROIDS    Location: [facility] OR  / SURGERY [facility]    Surgeons: Arlene Caro D.O.          Relevant Problems   ANESTHESIA   (negative) History of anesthesia complications      PULMONARY   (negative) Asthma   (negative) Chronic obstructive pulmonary disease (COPD) (HCC)      NEURO   (negative) CVA (cerebral vascular accident) (HCC)      CARDIAC   (positive) Incomplete RBBB      GI   (positive) Gastroesophageal reflux disease      ENDO   (positive) Hypothyroidism      Other   (positive) Anxiety   (positive) Palpitations       Physical Exam    Airway   Mallampati: II  TM distance: >3 FB  Neck ROM: full       Cardiovascular - normal exam  Rhythm: regular  Rate: normal  (-) murmur     Dental - normal exam           Pulmonary - normal exam  Breath sounds clear to auscultation     Abdominal    Neurological - normal exam                 Anesthesia Plan    ASA 2       Plan - general       Airway plan will be ETT          Induction: intravenous    Postoperative Plan: Postoperative administration of opioids is intended.    Pertinent diagnostic labs and testing reviewed    Informed Consent:    Anesthetic plan and risks discussed with patient.    Use of blood products discussed with: whom consented to blood products.

## 2023-03-28 NOTE — OP REPORT
PREOPERATIVE DIAGNOSES:   37 y.o.  with enlarged fibroid uterus     POSTOPERATIVE DIAGNOSES:   Same   Cystocele noted in operating room following removal of enlarged uterus     SURGEON: Arlene Caro DO     ASSISTANT: Rebecca Mccullough PA-C     PROCEDURES PERFORMED:   Robotic Assisted total laparoscopic hysterectomy, bilateral salpingectomy, left ovarian cystectomy, anterior repair, cystoscopy    Enlarged uterus-uterus weighed 630 g on OR scale     ANESTHESIA: General endotracheal anesthesia.      ANESTHESIOLOGIST: Grisel Lechuga MD     ESTIMATED BLOOD LOSS: 50 mL      URINE OUTPUT: 50 mL of clear urine at the end of procedure.      FINDINGS: Normal-appearing external female genitalia, on bimanual exam uterus is enlarged with bulky posterior fibroid, poorly mobile filling pelvis, no adnexal masses appreciated.  Upon speculum exam vagina is pink moist and well rugated, normal cervix, uterus sounded to 9cm.  Upon entry into the peritoneal cavity normal-appearing upper abdominal anatomy is noted.  Enlarged uterus with large posterior fibroid and fibroid coming off right lower uterine segment, small simple cyst on left ovary and otherwise normal-appearing bilateral ovaries and tubes.  On cystoscopy at the conclusion of the case normal-appearing bladder is intact without any masses or lesions.  Bilateral ureteral efflux is noted.      COMPLICATIONS: none      PROCEDURE IN DETAIL: Patient is identified in the preoperative area.  Procedure and name are verified.  Informed consent is verified and patient reports all of her questions have been answered to her satisfaction.  The patient was then taken to the operating room and transferred to the OR table in supine position.  Serial compression devices were activated.  General endotracheal anesthesia was induced without difficulty.  The patient was placed in dorsal lithotomy position with her arms tucked at each side with careful attention to pad all flexion  points.  A timeout was performed in which the patient's identity and procedures to be performed were all agreed upon by all members present. She was then prepped and draped in the usual sterile fashion. Preoperative antibiotics had been given. Examination under anesthesia was performed and a Mccall catheter was placed under sterile technique.  A speculum was placed into the vagina single-tooth tenaculum was used to grasp the cervix.  The cervix was sounded to [9] cm and dilated to accommodate the uterine manipulator.  Surgeon then changed gloves and attention was turned to the abdominal portion of the case.   A Veress needle was used to enter the peritoneal cavity with an opening pressure of 3 mmHg.  Pressure was then turned to high flow and the abdomen was insufflated with CO2 gas at a pressure of 15 mmHg and pneumoperitoneum was maintained.  Approximately 5 cc of quarter percent Marcaine was injected into the base of the umbilicus. An umbilical incision was then created to accommodate an 8 millimeter robotic trocar. The 8 millimeter robotic trocar was inserted into the abdomen. The scope was introduced to confirm intraperitoneal placement with no injuries. The remainder of the trocars were then all placed under direct visualization after injection with marcaine. The patient was placed in steep Trendelenburg positioning to assist with visualization of the pelvis and the robot was docked per the routine. The surgeon moved to the console. Bilateral ureters were visualized transperitoneally to be coursing in the normal anatomic position.  The right fallopian tube was grasped at its fimbriated end and transected from the mesosalpinx and ultimately the tube was transected at the cornua.  This tube was then removed from the abdominal cavity. The uteroovarian ligament was then coagulated and transected with hemostasis maintained.  The round ligament was then taken down with bipolar and then monopolar cautery. This allowed  entry into the retroperitoneal space. The vaginal assistant then placed the uterus on cephalad traction so that the ring impression could be seen within the vagina which is difficult given bulky fibroid in right lower uterine segment. The leafs of the broad ligament were then carefully dissected.  This was performed anteriorly first to create the bladder flap and drop the bladder down off of the anterior surface of the uterus and cervix.  The right uterine artery was unable to be skeletonized at this time and therefore attention was turned to the left side of the case.  Left fallopian tube was grasped and retracted laterally so coagulation and sharp dissection was able to be utilized to separate the fallopian tube from the mesosalpinx.  This dissection was carried out to the level of the cornua and the tube was ultimately transected at the level of the cornua and then removed from the body.  The left utero-ovarian ligaments were coagulated and transected.  This dissection was carried up towards the round which was coagulated and transected.  The anterior and posterior leaves of the broad ligament were then transected while the uterus was kept on traction so the ring impression was able to be seen around the cervix.  The bladder was fully dropped off the pubocervical fascia.  The uterine artery was fully skeletonized and then taken down with bipolar cautery.   The pubocervical fascia was further identified anteriorly and posteriorly.  The superior branch was then transected. The uterine artery pedicle was further delineated and coagulated and then transected.  Anterior colpotomy was created and this dissection was carried out towards the right this helped with delineation of the right uterine vessels.  These were fully skeletonized and similar procedure was carried on the right that had been performed on the left.  The colpotomy was then completed in its entirety and the uterine manipulator was removed from the  vagina.  A 15 bag was introduced through the vagina and the uterus and cervix were placed within the bag.  This bag was then brought down through the vagina.  The robot was undocked surgeon rescrubbed.  Vaginal morcellation using the excite technique was then utilized to remove the uterus and cervix vaginally.  No defects were noted within the bag and the uterus and cervix were removed in 1 piece.  After morcellation was performed cystocele was noted prior to placing a sponge stick in the vagina.  The robot was then redocked and surgeon return to the robotic console.  The bladder was backfilled and further dissection was carried out between the bladder and vagina to allow for anterior repair to be performed.  After adequate space was delineated 0 Vicryl was utilized to plicate the anterior vaginal wall and shorten it.  After adequate shortening of the anterior vaginal wall attention was turned to closure of the colpotomy.  The cuff was irrigated.  The cuff was noted to be hemostatic. TThe vaginal cuff was closed with running suture of 0-vicryl beginning at the left apex.  A 0-monocryl stratifix suture was anchored at the right apex and then run to the other apex to form a second imbricating layer.  This imbricating layer incorporated the uterosacral ligaments. Pelvis and cuff were all irrigated. All surgical sites were noted to be hemostatic. The robot was then undocked and backed safely away from the patient.  Surgeon re scrubbed.      The montez was removed from the bladder and attention was turned to the cystoscopy.  Cystoscopy was performed with a full bladder survey revealing no foreign objects in the bladder nor any defects.  Bilateral ureteral reflux was noted.  The Montez was left out.  Vaginal cuff was inspected and confirmed to be well approximated and hemostatic.  No vaginal lacerations noted.      Gloves were changed and the skin incisions were closed with Dermabond. Sponge, needle, instrument, and lap  counts were correct x2. Patient tolerated the procedure well and went to recovery room in stable condition.         ____________________________________   Arlene Caro DO FACOG

## 2023-03-28 NOTE — H&P
History and physical  CC: fibroid uterus   Planned procedure: Robotic assisted total laparoscopic hysterectomy with possible bilateral salpingo-oophorectomy, cystoscopy, and other indicated procedures    Ceci Wallace is a 37 y.o.  who presents today for above noted surgery due to enlarged fibroid uterus.  US performed 22 revealed uterus measured 13.8x9.6x9cm with EL 7mm and normal appearing right ovary.  Left ovary not seen.  Large posterior fibroid measuring [id number].   Patient has a history of Factor V Leiden with no coagulation events.     OB History:    2 NSVDs - largest infant 7lbs 15oz    Review of Systems:   Pertinent positives documented in HPI and all other systems reviewed & are negative.    All PMH, PSH, allergies, social history and FH reviewed and updated today:  Past Medical History:  Past Medical History:   Diagnosis Date    Arrhythmia     palpitations rarely    Bowel habit changes     constipation and diarrhea    Disorder of thyroid     Factor 5 Leiden mutation, heterozygous (HCC)     Gastroesophageal reflux disease 3/28/2023    Heart burn     Hypothyroid 2012    Psychiatric problem     anxiety       Past Surgical History:  Past Surgical History:   Procedure Laterality Date    TONSILLECTOMY         Medications:   Current Facility-Administered Medications Ordered in Epic   Medication Dose Route Frequency Provider Last Rate Last Admin    lidocaine (XYLOCAINE) 1 % injection 0.5 mL  0.5 mL Intradermal Once PRN Arlene Caro D.O.        lactated ringers infusion   Intravenous Continuous Arlene Caro D.O. 10 mL/hr at 23 0640 New Bag at 23 0640    scopolamine (TRANSDERM-SCOP) patch 1 Patch  1 Patch Transdermal Q72HRS Grisel Lechuga M.D.         No current [facility]-ordered outpatient medications on file.       Allergies: Benzodiazepines    Social History:  Social History     Socioeconomic History    Marital status:    Tobacco Use    Smoking  "status: Former     Packs/day: 0.50     Years: 5.00     Pack years: 2.50     Types: Cigarettes     Quit date: 3/8/2010     Years since quittin.0    Smokeless tobacco: Never   Vaping Use    Vaping Use: Never used   Substance and Sexual Activity    Alcohol use: Not Currently    Drug use: Never    Sexual activity: Yes     Partners: Male   Social History Narrative    ** Merged History Encounter **            Family History:  Family History   Problem Relation Age of Onset    Hypertension Father            Objective:   Vitals:  /68   Pulse 63   Temp 37.1 °C (98.7 °F) (Temporal)   Resp 16   Ht 1.778 m (5' 10\")   Wt 89.3 kg (196 lb 13.9 oz)   SpO2 99%   Body mass index is 28.25 kg/m². (Goal BM I>18 <25)    Physical Exam:   Nursing note and vitals reviewed.  GENERAL: No acute distress  HENT: Atraumatic, normocephalic  EYES: Extraocular movements intact, pupils equal and reactive to light  NECK: Supple, Full ROM  CHEST: No deformities, Equal chest expansion  RESP: Unlabored, no stridor or audible wheeze  ABD: Soft, Nontender, Non-Distended  Extremities: No Clubbing, Cyanosis, or Edema  Skin: Warm/dry, without rashes  Neuro: A/O x 4, CN 2-12 Grossly intact, Motor/sensory grossly intact  Psych: Normal mood, behavior, and affect       Assessment/Plan:   Ceci Wallace is a 37 y.o.  who presents for above-noted surgical procedure due toenlarged fibroid uterus    #We will plan to perform above-noted surgery.  I have discussed risks, benefits, and alternatives with the patient.  She reports that her questions have been answered to her satisfaction and she has signed her own consent.  Preoperative planning and postoperative expectations have been reviewed with the patient.  Prescriptions have been sent to pharmacy.  She has been scheduled for her postoperative appointment.  We will plan for outpatient surgery today.    "

## 2023-03-28 NOTE — ANESTHESIA TIME REPORT
Anesthesia Start and Stop Event Times     Date Time Event    3/28/2023 0712 Ready for Procedure     0733 Anesthesia Start     0949 Anesthesia Stop        Responsible Staff  03/28/23    Name Role Begin End    Grisel Lechuga M.D. Anesth [id number]        Overtime Reason:  no overtime (within assigned shift)    Comments:

## 2023-03-28 NOTE — ANESTHESIA PROCEDURE NOTES
Airway    Date/Time: 3/28/2023 7:42 AM  Performed by: Grisel Lechuga M.D.  Authorized by: Grisel Lechuga M.D.     Location:  OR  Urgency:  Elective  Difficult Airway: No    Indications for Airway Management:  Anesthesia      Spontaneous Ventilation: absent    Sedation Level:  Deep  Preoxygenated: Yes    Patient Position:  Sniffing  Mask Difficulty Assessment:  1 - vent by mask  Final Airway Type:  Endotracheal airway  Final Endotracheal Airway:  ETT  Cuffed: Yes    Technique Used for Successful ETT Placement:  Direct laryngoscopy    Insertion Site:  Oral  Blade Type:  Mally  Laryngoscope Blade/Videolaryngoscope Blade Size:  3  ETT Size (mm):  6.5  Measured from:  Teeth  ETT to Teeth (cm):  22  Placement Verified by: auscultation and capnometry    Cormack-Lehane Classification:  Grade I - full view of glottis  Number of Attempts at Approach:  1   Eyes taped prior to mask ventilation.

## 2023-03-28 NOTE — ANESTHESIA POSTPROCEDURE EVALUATION
Patient: Ceci Wallace    Procedure Summary     Date: 03/28/23 Room / Location: [place] [id number] / SURGERY [facility]    Anesthesia Start: 0733 Anesthesia Stop: 0949    Procedures:       ROBOTIC ASSISTED LAPAROSCOPIC TOTAL HYSTERECTOMY (Uterus)      BILATERAL SALPINGECTOMY (Bilateral: Abdomen)      CYSTOSCOPY (Bladder) Diagnosis: (ENLARGED UTERUS, MENORRHAGIA, FIBROIDS)    Surgeons: Arlene Caro D.O. Responsible Provider: Grisel Lechuga M.D.    Anesthesia Type: general ASA Status: 2          Final Anesthesia Type: general  Last vitals  BP   Blood Pressure: 121/65    Temp   36.3 °C (97.3 °F)    Pulse   60   Resp   17    SpO2   96 %      Anesthesia Post Evaluation    Patient location during evaluation: PACU  Patient participation: complete - patient participated  Level of consciousness: awake and alert    Airway patency: patent  Anesthetic complications: no  Cardiovascular status: hemodynamically stable  Respiratory status: acceptable  Hydration status: euvolemic    PONV: none          No notable events documented.     Nurse Pain Score: 4 (NPRS)

## 2023-03-28 NOTE — OR NURSING
Patient arrived to PACU from OR in stable condition. Report received from anesthesia and OR RN at 0947. VSS and initial assessment complete.    Pt awake c/o minimal pain. Medicated for shivering.  1025 medicated with PO pain meds, tolerating well.  1100 pt stable and comfortable, ready for phase 2 status.  Report to phase 2 Shaista RN.  Patient discharged to phase 2 in stable condition, consistent with preoperative status.

## 2023-06-09 ENCOUNTER — APPOINTMENT (RX ONLY)
Dept: URBAN - METROPOLITAN AREA CLINIC 22 | Facility: CLINIC | Age: 38
Setting detail: DERMATOLOGY
End: 2023-06-09

## 2023-06-09 DIAGNOSIS — D22 MELANOCYTIC NEVI: ICD-10-CM

## 2023-06-09 DIAGNOSIS — L81.4 OTHER MELANIN HYPERPIGMENTATION: ICD-10-CM

## 2023-06-09 DIAGNOSIS — D18.0 HEMANGIOMA: ICD-10-CM

## 2023-06-09 DIAGNOSIS — Z71.89 OTHER SPECIFIED COUNSELING: ICD-10-CM

## 2023-06-09 DIAGNOSIS — Z87.2 PERSONAL HISTORY OF DISEASES OF THE SKIN AND SUBCUTANEOUS TISSUE: ICD-10-CM

## 2023-06-09 PROBLEM — D18.01 HEMANGIOMA OF SKIN AND SUBCUTANEOUS TISSUE: Status: ACTIVE | Noted: 2023-06-09

## 2023-06-09 PROBLEM — D23.71 OTHER BENIGN NEOPLASM OF SKIN OF RIGHT LOWER LIMB, INCLUDING HIP: Status: ACTIVE | Noted: 2023-06-09

## 2023-06-09 PROBLEM — D22.5 MELANOCYTIC NEVI OF TRUNK: Status: ACTIVE | Noted: 2023-06-09

## 2023-06-09 PROBLEM — D22.22 MELANOCYTIC NEVI OF LEFT EAR AND EXTERNAL AURICULAR CANAL: Status: ACTIVE | Noted: 2023-06-09

## 2023-06-09 PROCEDURE — ? SUNSCREEN RECOMMENDATIONS

## 2023-06-09 PROCEDURE — 99213 OFFICE O/P EST LOW 20 MIN: CPT

## 2023-06-09 PROCEDURE — ? COUNSELING

## 2023-06-09 PROCEDURE — ? ADDITIONAL NOTES

## 2023-06-09 ASSESSMENT — LOCATION DETAILED DESCRIPTION DERM
LOCATION DETAILED: LEFT LATERAL ABDOMEN
LOCATION DETAILED: LEFT INFERIOR POSTERIOR HELIX
LOCATION DETAILED: LEFT PROXIMAL DORSAL FOREARM
LOCATION DETAILED: LEFT INFERIOR UPPER BACK

## 2023-06-09 ASSESSMENT — LOCATION ZONE DERM
LOCATION ZONE: TRUNK
LOCATION ZONE: ARM
LOCATION ZONE: EAR

## 2023-06-09 ASSESSMENT — LOCATION SIMPLE DESCRIPTION DERM
LOCATION SIMPLE: LEFT FOREARM
LOCATION SIMPLE: ABDOMEN
LOCATION SIMPLE: LEFT UPPER BACK
LOCATION SIMPLE: LEFT EAR

## 2023-06-09 NOTE — PROCEDURE: ADDITIONAL NOTES
Render Risk Assessment In Note?: no
Additional Notes: Pt states this was around 2013
Detail Level: Simple

## 2024-03-06 ENCOUNTER — HOSPITAL ENCOUNTER (OUTPATIENT)
Dept: RADIOLOGY | Facility: MEDICAL CENTER | Age: 39
End: 2024-03-06
Attending: NURSE PRACTITIONER
Payer: COMMERCIAL

## 2024-03-06 DIAGNOSIS — N63.21 MASS OF UPPER OUTER QUADRANT OF LEFT BREAST: ICD-10-CM

## 2024-03-06 PROCEDURE — G0279 TOMOSYNTHESIS, MAMMO: HCPCS

## 2024-03-06 PROCEDURE — 76642 ULTRASOUND BREAST LIMITED: CPT | Mod: RT

## 2024-03-15 ENCOUNTER — HOSPITAL ENCOUNTER (OUTPATIENT)
Dept: RADIOLOGY | Facility: MEDICAL CENTER | Age: 39
End: 2024-03-15
Attending: NURSE PRACTITIONER
Payer: COMMERCIAL

## 2024-03-15 DIAGNOSIS — R92.8 ABNORMAL FINDINGS ON DIAGNOSTIC IMAGING OF BREAST: ICD-10-CM

## 2024-03-15 LAB — PATHOLOGY CONSULT NOTE: NORMAL

## 2024-03-15 PROCEDURE — A4648 IMPLANTABLE TISSUE MARKER: HCPCS

## 2024-03-15 PROCEDURE — 77065 DX MAMMO INCL CAD UNI: CPT | Mod: RT

## 2024-03-15 PROCEDURE — 88305 TISSUE EXAM BY PATHOLOGIST: CPT | Mod: 59

## 2024-03-18 ENCOUNTER — TELEPHONE (OUTPATIENT)
Dept: RADIOLOGY | Facility: MEDICAL CENTER | Age: 39
End: 2024-03-18
Payer: COMMERCIAL

## 2024-06-07 ENCOUNTER — APPOINTMENT (RX ONLY)
Dept: URBAN - METROPOLITAN AREA CLINIC 22 | Facility: CLINIC | Age: 39
Setting detail: DERMATOLOGY
End: 2024-06-07

## 2024-06-07 DIAGNOSIS — Z71.89 OTHER SPECIFIED COUNSELING: ICD-10-CM

## 2024-06-07 DIAGNOSIS — Z87.2 PERSONAL HISTORY OF DISEASES OF THE SKIN AND SUBCUTANEOUS TISSUE: ICD-10-CM

## 2024-06-07 DIAGNOSIS — D22 MELANOCYTIC NEVI: ICD-10-CM

## 2024-06-07 DIAGNOSIS — D18.0 HEMANGIOMA: ICD-10-CM

## 2024-06-07 DIAGNOSIS — L81.4 OTHER MELANIN HYPERPIGMENTATION: ICD-10-CM

## 2024-06-07 DIAGNOSIS — L56.2 PHOTOCONTACT DERMATITIS [BERLOQUE DERMATITIS]: ICD-10-CM | Status: RESOLVING

## 2024-06-07 DIAGNOSIS — L82.1 OTHER SEBORRHEIC KERATOSIS: ICD-10-CM

## 2024-06-07 PROBLEM — D22.5 MELANOCYTIC NEVI OF TRUNK: Status: ACTIVE | Noted: 2024-06-07

## 2024-06-07 PROBLEM — D48.5 NEOPLASM OF UNCERTAIN BEHAVIOR OF SKIN: Status: ACTIVE | Noted: 2024-06-07

## 2024-06-07 PROBLEM — D18.01 HEMANGIOMA OF SKIN AND SUBCUTANEOUS TISSUE: Status: ACTIVE | Noted: 2024-06-07

## 2024-06-07 PROBLEM — D23.71 OTHER BENIGN NEOPLASM OF SKIN OF RIGHT LOWER LIMB, INCLUDING HIP: Status: ACTIVE | Noted: 2024-06-07

## 2024-06-07 PROCEDURE — ? ADDITIONAL NOTES

## 2024-06-07 PROCEDURE — ? COUNSELING

## 2024-06-07 PROCEDURE — 99213 OFFICE O/P EST LOW 20 MIN: CPT

## 2024-06-07 PROCEDURE — ? OBSERVATION AND MEASURE

## 2024-06-07 PROCEDURE — ? SUNSCREEN RECOMMENDATIONS

## 2024-06-07 PROCEDURE — ? DEFER

## 2024-06-07 ASSESSMENT — LOCATION SIMPLE DESCRIPTION DERM
LOCATION SIMPLE: RIGHT UPPER BACK
LOCATION SIMPLE: RIGHT THIGH
LOCATION SIMPLE: LEFT SCALP
LOCATION SIMPLE: LEFT SCALP
LOCATION SIMPLE: LEFT UPPER BACK
LOCATION SIMPLE: LEFT FOREARM
LOCATION SIMPLE: ABDOMEN

## 2024-06-07 ASSESSMENT — LOCATION DETAILED DESCRIPTION DERM
LOCATION DETAILED: RIGHT MID-UPPER BACK
LOCATION DETAILED: LEFT INFERIOR UPPER BACK
LOCATION DETAILED: LEFT CENTRAL FRONTAL SCALP
LOCATION DETAILED: LEFT PROXIMAL DORSAL FOREARM
LOCATION DETAILED: LEFT LATERAL ABDOMEN
LOCATION DETAILED: LEFT CENTRAL FRONTAL SCALP
LOCATION DETAILED: RIGHT ANTERIOR DISTAL THIGH

## 2024-06-07 ASSESSMENT — LOCATION ZONE DERM
LOCATION ZONE: TRUNK
LOCATION ZONE: SCALP
LOCATION ZONE: LEG
LOCATION ZONE: SCALP
LOCATION ZONE: ARM

## 2024-06-07 NOTE — PROCEDURE: DEFER
Detail Level: Detailed
Size Of Lesion In Cm (Optional): 0
Reason To Defer Override: Pt elected procedure but when she gets back from camping trip.
Introduction Text (Please End With A Colon): .
detailed exam

## 2024-06-07 NOTE — PROCEDURE: ADDITIONAL NOTES
Render Risk Assessment In Note?: no
Additional Notes: Pt states this was around 2013
Detail Level: Simple
Additional Notes: Pt was on vacation and notes she was eating [place].  Consistent with resolving dermatitis at this time

## 2024-06-19 ENCOUNTER — APPOINTMENT (RX ONLY)
Dept: URBAN - METROPOLITAN AREA CLINIC 22 | Facility: CLINIC | Age: 39
Setting detail: DERMATOLOGY
End: 2024-06-19

## 2024-06-19 DIAGNOSIS — D22 MELANOCYTIC NEVI: ICD-10-CM

## 2024-06-19 PROBLEM — D48.5 NEOPLASM OF UNCERTAIN BEHAVIOR OF SKIN: Status: ACTIVE | Noted: 2024-06-19

## 2024-06-19 PROCEDURE — 11102 TANGNTL BX SKIN SINGLE LES: CPT

## 2024-06-19 PROCEDURE — ? BIOPSY BY SHAVE METHOD

## 2024-06-19 ASSESSMENT — LOCATION DETAILED DESCRIPTION DERM
LOCATION DETAILED: LEFT CENTRAL FRONTAL SCALP
LOCATION DETAILED: LEFT CENTRAL FRONTAL SCALP

## 2024-06-19 ASSESSMENT — LOCATION ZONE DERM
LOCATION ZONE: SCALP
LOCATION ZONE: SCALP

## 2024-06-19 ASSESSMENT — LOCATION SIMPLE DESCRIPTION DERM
LOCATION SIMPLE: LEFT SCALP
LOCATION SIMPLE: LEFT SCALP

## 2024-10-11 ENCOUNTER — APPOINTMENT (OUTPATIENT)
Dept: RADIOLOGY | Facility: MEDICAL CENTER | Age: 39
End: 2024-10-11
Attending: NURSE PRACTITIONER
Payer: COMMERCIAL

## 2024-11-22 ENCOUNTER — HOSPITAL ENCOUNTER (OUTPATIENT)
Dept: RADIOLOGY | Facility: MEDICAL CENTER | Age: 39
End: 2024-11-22
Attending: NURSE PRACTITIONER
Payer: COMMERCIAL

## 2024-11-22 DIAGNOSIS — R92.8 ABNORMAL MAMMOGRAM: ICD-10-CM

## 2024-11-22 PROCEDURE — 76642 ULTRASOUND BREAST LIMITED: CPT | Mod: LT

## 2024-11-22 PROCEDURE — G0279 TOMOSYNTHESIS, MAMMO: HCPCS

## 2025-01-27 ENCOUNTER — RESEARCH ENCOUNTER (OUTPATIENT)
Dept: RESEARCH | Facility: MEDICAL CENTER | Age: 40
End: 2025-01-27
Payer: COMMERCIAL

## (undated) DEVICE — SET LEADWIRE 5 LEAD BEDSIDE DISPOSABLE ECG (1SET OF 5/EA)

## (undated) DEVICE — ELECTRODE DUAL RETURN W/ CORD - (50/PK)

## (undated) DEVICE — COVER TIP ENDOWRIST HOT SHEAR - (10EA/BX) DA VINCI

## (undated) DEVICE — SLEEVE VASO CALF MED - (10PR/CA)

## (undated) DEVICE — DRAPE ARM  BOX OF 20

## (undated) DEVICE — TUBE CONNECT SUCTION CLEAR 120 X 1/4" (50EA/CA)"

## (undated) DEVICE — PACK GYN DAVINCI (1EA/CA)

## (undated) DEVICE — SHEARS MONOPOLAR CURVED  DA VINCI 10X'S REUSABLE

## (undated) DEVICE — CANISTER SUCTION 3000ML MECHANICAL FILTER AUTO SHUTOFF MEDI-VAC NONSTERILE LF DISP  (40EA/CA)

## (undated) DEVICE — TUBING CLEARLINK DUO-VENT - C-FLO (48EA/CA)

## (undated) DEVICE — TRAY FOLEY CATHETER STATLOCK 16FR SURESTEP  (10EA/CA)

## (undated) DEVICE — SPECIMEN BAG ENDOCATCH II15MM 15MM SPECIMEN RETRIEVAL (3EA/CA)

## (undated) DEVICE — GLOVE BIOGEL PI ORTHO SZ 6 SURGICAL PF LF (40PR/BX)

## (undated) DEVICE — LACTATED RINGERS INJ 1000 ML - (14EA/CA 60CA/PF)

## (undated) DEVICE — PAD OR TABLE DA VINCI 2IN X 20IN X 72IN - (12EA/CA)

## (undated) DEVICE — TRAY SRGPRP PVP IOD WT PRP - (20/CA)

## (undated) DEVICE — PACK TRENGUARD 450 PROCEDURE (12EA/CA)

## (undated) DEVICE — DERMABOND ADVANCED - (12EA/BX)

## (undated) DEVICE — GLOVE BIOGEL PI INDICATOR SZ 8.0 SURGICAL PF LF -(50/BX 4BX/CA)

## (undated) DEVICE — SET SUCTION/IRRIGATION WITH DISPOSABLE TIP (6/CA )PART #0250-070-520 IS A SUB

## (undated) DEVICE — SET EXTENSION WITH 2 PORTS (48EA/CA) ***PART #2C8610 IS A SUBSTITUTE*****

## (undated) DEVICE — SEAL 5MM-8MM UNIVERSAL  BOX OF 10

## (undated) DEVICE — ARMREST CRADLE FOAM - (2PR/PK 12PR/CA)

## (undated) DEVICE — GLOVE SZ 6.5 BIOGEL PI MICRO - PF LF (50PR/BX)

## (undated) DEVICE — DRAPE COLUMN  BOX OF 20

## (undated) DEVICE — GOWN WARMING STANDARD FLEX - (30/CA)

## (undated) DEVICE — DRIVER LARGE SUTURECUT NEEDLE (15UN/EA)

## (undated) DEVICE — SUTURE 2-0 20CM STRATAFIX SPIRAL SH NEEDLE (12/BX)

## (undated) DEVICE — TOWEL STOP TIMEOUT SAFETY FLAG (40EA/CA)

## (undated) DEVICE — NEEDLE INSFL 120MM 14GA VRRS - (20/BX)

## (undated) DEVICE — BIPOLAR LONG (14UN/EA)

## (undated) DEVICE — ROBOTIC SURGERY SERVICES

## (undated) DEVICE — COVER LIGHT HANDLE ALC PLUS DISP (18EA/BX)

## (undated) DEVICE — SUCTION INSTRUMENT YANKAUER BULBOUS TIP W/O VENT (50EA/CA)

## (undated) DEVICE — GOWN SURGEONS X-LARGE - DISP. (30/CA)

## (undated) DEVICE — WATER IRRIGATION STERILE 1000ML (12EA/CA)

## (undated) DEVICE — GLOVE BIOGEL SZ 8 SURGICAL PF LTX - (50PR/BX 4BX/CA)

## (undated) DEVICE — BLADE SURGICAL #11 - (50/BX)

## (undated) DEVICE — OBTURATOR BLADELESS STANDARD 8MM (6EA/BX)

## (undated) DEVICE — SENSOR OXIMETER ADULT SPO2 RD SET (20EA/BX)

## (undated) DEVICE — SUTURE GENERAL

## (undated) DEVICE — SUTURE 0 VICRYL PLUS CT-2 - 27 INCH (36/BX)

## (undated) DEVICE — UTERINE MANIP V-CARE LARGE - (DAVINCI)  8/CA